# Patient Record
Sex: MALE | Race: WHITE | NOT HISPANIC OR LATINO | Employment: FULL TIME | URBAN - METROPOLITAN AREA
[De-identification: names, ages, dates, MRNs, and addresses within clinical notes are randomized per-mention and may not be internally consistent; named-entity substitution may affect disease eponyms.]

---

## 2024-01-29 ENCOUNTER — HOSPITAL ENCOUNTER (EMERGENCY)
Facility: HOSPITAL | Age: 44
Discharge: ANOTHER HOSPITAL | End: 2024-01-30
Attending: EMERGENCY MEDICINE
Payer: COMMERCIAL

## 2024-01-29 DIAGNOSIS — T18.108A ESOPHAGEAL FOREIGN BODY, INITIAL ENCOUNTER: Primary | ICD-10-CM

## 2024-01-29 PROCEDURE — 63600175 PHARM REV CODE 636 W HCPCS: Mod: JZ,TB | Performed by: EMERGENCY MEDICINE

## 2024-01-29 PROCEDURE — 96374 THER/PROPH/DIAG INJ IV PUSH: CPT

## 2024-01-29 PROCEDURE — 99285 EMERGENCY DEPT VISIT HI MDM: CPT

## 2024-01-29 RX ORDER — GLUCAGON 1 MG
1 KIT INJECTION
Status: COMPLETED | OUTPATIENT
Start: 2024-01-29 | End: 2024-01-29

## 2024-01-29 RX ADMIN — Medication 1 MG: at 11:01

## 2024-01-30 ENCOUNTER — HOSPITAL ENCOUNTER (EMERGENCY)
Facility: HOSPITAL | Age: 44
Discharge: HOME | End: 2024-01-30
Attending: EMERGENCY MEDICINE
Payer: COMMERCIAL

## 2024-01-30 ENCOUNTER — ANESTHESIA (OUTPATIENT)
Dept: ENDOSCOPY | Facility: HOSPITAL | Age: 44
End: 2024-01-30
Payer: COMMERCIAL

## 2024-01-30 ENCOUNTER — ANESTHESIA EVENT (OUTPATIENT)
Dept: ENDOSCOPY | Facility: HOSPITAL | Age: 44
End: 2024-01-30
Payer: COMMERCIAL

## 2024-01-30 VITALS
HEIGHT: 75 IN | BODY MASS INDEX: 34.49 KG/M2 | TEMPERATURE: 98 F | HEART RATE: 65 BPM | DIASTOLIC BLOOD PRESSURE: 85 MMHG | WEIGHT: 277.44 LBS | RESPIRATION RATE: 12 BRPM | OXYGEN SATURATION: 94 % | SYSTOLIC BLOOD PRESSURE: 142 MMHG

## 2024-01-30 VITALS
HEIGHT: 75 IN | BODY MASS INDEX: 34.19 KG/M2 | DIASTOLIC BLOOD PRESSURE: 75 MMHG | TEMPERATURE: 99 F | WEIGHT: 275 LBS | OXYGEN SATURATION: 100 % | SYSTOLIC BLOOD PRESSURE: 124 MMHG | HEART RATE: 86 BPM | RESPIRATION RATE: 20 BRPM

## 2024-01-30 DIAGNOSIS — T18.128A ESOPHAGEAL OBSTRUCTION DUE TO FOOD IMPACTION: Primary | ICD-10-CM

## 2024-01-30 DIAGNOSIS — K20.90 ESOPHAGITIS: ICD-10-CM
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
## 2024-01-30 DIAGNOSIS — R11.10 VOMITING, UNSPECIFIED VOMITING TYPE, UNSPECIFIED WHETHER NAUSEA PRESENT: ICD-10-CM

## 2024-01-30 DIAGNOSIS — Z01.810 PREOP CARDIOVASCULAR EXAM: ICD-10-CM
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
## 2024-01-30 DIAGNOSIS — K56.699 FOOD BOLUS OBSTRUCTION OF INTESTINE: ICD-10-CM

## 2024-01-30 LAB
ALBUMIN SERPL BCP-MCNC: 3.9 G/DL (ref 3.5–5.2)
ALP SERPL-CCNC: 71 U/L (ref 55–135)
ALT SERPL W/O P-5'-P-CCNC: 44 U/L (ref 10–44)
ANION GAP SERPL CALC-SCNC: 13 MMOL/L (ref 8–16)
AST SERPL-CCNC: 26 U/L (ref 10–40)
BASOPHILS # BLD AUTO: 0.05 K/UL (ref 0–0.2)
BASOPHILS NFR BLD: 0.4 % (ref 0–1.9)
BILIRUB SERPL-MCNC: 0.4 MG/DL (ref 0.1–1)
BUN SERPL-MCNC: 14 MG/DL (ref 6–20)
CALCIUM SERPL-MCNC: 8.7 MG/DL (ref 8.7–10.5)
CHLORIDE SERPL-SCNC: 110 MMOL/L (ref 95–110)
CO2 SERPL-SCNC: 18 MMOL/L (ref 23–29)
CREAT SERPL-MCNC: 0.8 MG/DL (ref 0.5–1.4)
DIFFERENTIAL METHOD BLD: ABNORMAL
EOSINOPHIL # BLD AUTO: 0.6 K/UL (ref 0–0.5)
EOSINOPHIL NFR BLD: 5.2 % (ref 0–8)
ERYTHROCYTE [DISTWIDTH] IN BLOOD BY AUTOMATED COUNT: 12.9 % (ref 11.5–14.5)
EST. GFR  (NO RACE VARIABLE): >60 ML/MIN/1.73 M^2
GLUCOSE SERPL-MCNC: 113 MG/DL (ref 70–110)
HCT VFR BLD AUTO: 43.9 % (ref 40–54)
HGB BLD-MCNC: 13.8 G/DL (ref 14–18)
IMM GRANULOCYTES # BLD AUTO: 0.04 K/UL (ref 0–0.04)
IMM GRANULOCYTES NFR BLD AUTO: 0.4 % (ref 0–0.5)
LYMPHOCYTES # BLD AUTO: 1.8 K/UL (ref 1–4.8)
LYMPHOCYTES NFR BLD: 16 % (ref 18–48)
MCH RBC QN AUTO: 28.2 PG (ref 27–31)
MCHC RBC AUTO-ENTMCNC: 31.4 G/DL (ref 32–36)
MCV RBC AUTO: 90 FL (ref 82–98)
MONOCYTES # BLD AUTO: 0.8 K/UL (ref 0.3–1)
MONOCYTES NFR BLD: 6.8 % (ref 4–15)
NEUTROPHILS # BLD AUTO: 8 K/UL (ref 1.8–7.7)
NEUTROPHILS NFR BLD: 71.2 % (ref 38–73)
NRBC BLD-RTO: 0 /100 WBC
PLATELET # BLD AUTO: 217 K/UL (ref 150–450)
PMV BLD AUTO: 9.3 FL (ref 9.2–12.9)
POTASSIUM SERPL-SCNC: 3.9 MMOL/L (ref 3.5–5.1)
PROT SERPL-MCNC: 6.7 G/DL (ref 6–8.4)
RBC # BLD AUTO: 4.9 M/UL (ref 4.6–6.2)
SODIUM SERPL-SCNC: 141 MMOL/L (ref 136–145)
WBC # BLD AUTO: 11.28 K/UL (ref 3.9–12.7)

## 2024-01-30 PROCEDURE — 93010 ELECTROCARDIOGRAM REPORT: CPT | Mod: ,,, | Performed by: INTERNAL MEDICINE

## 2024-01-30 PROCEDURE — 99285 EMERGENCY DEPT VISIT HI MDM: CPT | Mod: 25

## 2024-01-30 PROCEDURE — 93005 ELECTROCARDIOGRAM TRACING: CPT | Mod: 59

## 2024-01-30 PROCEDURE — 25000003 PHARM REV CODE 250: Performed by: STUDENT IN AN ORGANIZED HEALTH CARE EDUCATION/TRAINING PROGRAM

## 2024-01-30 PROCEDURE — 88305 TISSUE EXAM BY PATHOLOGIST: CPT | Performed by: PATHOLOGY

## 2024-01-30 PROCEDURE — 25000003 PHARM REV CODE 250: Performed by: INTERNAL MEDICINE

## 2024-01-30 PROCEDURE — 99285 EMERGENCY DEPT VISIT HI MDM: CPT | Mod: 25,,, | Performed by: NURSE PRACTITIONER

## 2024-01-30 PROCEDURE — D9220A PRA ANESTHESIA: Mod: ANES,,, | Performed by: ANESTHESIOLOGY

## 2024-01-30 PROCEDURE — 63600175 PHARM REV CODE 636 W HCPCS: Performed by: STUDENT IN AN ORGANIZED HEALTH CARE EDUCATION/TRAINING PROGRAM

## 2024-01-30 PROCEDURE — 25000003 PHARM REV CODE 250: Performed by: EMERGENCY MEDICINE

## 2024-01-30 PROCEDURE — 63600175 PHARM REV CODE 636 W HCPCS: Performed by: EMERGENCY MEDICINE

## 2024-01-30 PROCEDURE — C9113 INJ PANTOPRAZOLE SODIUM, VIA: HCPCS | Performed by: EMERGENCY MEDICINE

## 2024-01-30 PROCEDURE — 85025 COMPLETE CBC W/AUTO DIFF WBC: CPT | Performed by: EMERGENCY MEDICINE

## 2024-01-30 PROCEDURE — 27201012 HC FORCEPS, HOT/COLD, DISP: Performed by: INTERNAL MEDICINE

## 2024-01-30 PROCEDURE — 43239 EGD BIOPSY SINGLE/MULTIPLE: CPT | Performed by: INTERNAL MEDICINE

## 2024-01-30 PROCEDURE — 96375 TX/PRO/DX INJ NEW DRUG ADDON: CPT

## 2024-01-30 PROCEDURE — 96374 THER/PROPH/DIAG INJ IV PUSH: CPT

## 2024-01-30 PROCEDURE — 80053 COMPREHEN METABOLIC PANEL: CPT | Performed by: EMERGENCY MEDICINE

## 2024-01-30 PROCEDURE — D9220A PRA ANESTHESIA: Mod: CRNA,,, | Performed by: STUDENT IN AN ORGANIZED HEALTH CARE EDUCATION/TRAINING PROGRAM

## 2024-01-30 PROCEDURE — 43239 EGD BIOPSY SINGLE/MULTIPLE: CPT | Mod: ,,, | Performed by: INTERNAL MEDICINE

## 2024-01-30 PROCEDURE — 37000009 HC ANESTHESIA EA ADD 15 MINS: Performed by: INTERNAL MEDICINE

## 2024-01-30 PROCEDURE — 37000008 HC ANESTHESIA 1ST 15 MINUTES: Performed by: INTERNAL MEDICINE

## 2024-01-30 PROCEDURE — 88305 TISSUE EXAM BY PATHOLOGIST: CPT | Mod: 26,,, | Performed by: PATHOLOGY

## 2024-01-30 PROCEDURE — 96361 HYDRATE IV INFUSION ADD-ON: CPT | Mod: 59

## 2024-01-30 RX ORDER — SUCCINYLCHOLINE CHLORIDE 20 MG/ML
INJECTION INTRAMUSCULAR; INTRAVENOUS
Status: DISCONTINUED | OUTPATIENT
Start: 2024-01-30 | End: 2024-01-30

## 2024-01-30 RX ORDER — ROCURONIUM BROMIDE 10 MG/ML
INJECTION, SOLUTION INTRAVENOUS
Status: DISCONTINUED
Start: 2024-01-30 | End: 2024-01-30 | Stop reason: HOSPADM

## 2024-01-30 RX ORDER — MIDAZOLAM HYDROCHLORIDE 1 MG/ML
INJECTION INTRAMUSCULAR; INTRAVENOUS
Status: DISCONTINUED | OUTPATIENT
Start: 2024-01-30 | End: 2024-01-30

## 2024-01-30 RX ORDER — DEXAMETHASONE SODIUM PHOSPHATE 10 MG/ML
INJECTION INTRAMUSCULAR; INTRAVENOUS
Status: DISCONTINUED
Start: 2024-01-30 | End: 2024-01-30 | Stop reason: HOSPADM

## 2024-01-30 RX ORDER — ONDANSETRON HYDROCHLORIDE 2 MG/ML
INJECTION, SOLUTION INTRAVENOUS
Status: COMPLETED
Start: 2024-01-30 | End: 2024-01-30

## 2024-01-30 RX ORDER — PROPOFOL 10 MG/ML
VIAL (ML) INTRAVENOUS
Status: DISCONTINUED | OUTPATIENT
Start: 2024-01-30 | End: 2024-01-30

## 2024-01-30 RX ORDER — PANTOPRAZOLE SODIUM 40 MG/10ML
40 INJECTION, POWDER, LYOPHILIZED, FOR SOLUTION INTRAVENOUS
Status: COMPLETED | OUTPATIENT
Start: 2024-01-30 | End: 2024-01-30

## 2024-01-30 RX ORDER — FENTANYL CITRATE 50 UG/ML
INJECTION, SOLUTION INTRAMUSCULAR; INTRAVENOUS
Status: DISCONTINUED | OUTPATIENT
Start: 2024-01-30 | End: 2024-01-30

## 2024-01-30 RX ORDER — MIDAZOLAM HYDROCHLORIDE 1 MG/ML
INJECTION INTRAMUSCULAR; INTRAVENOUS
Status: COMPLETED
Start: 2024-01-30 | End: 2024-01-30

## 2024-01-30 RX ORDER — DEXAMETHASONE SODIUM PHOSPHATE 100 MG/10ML
INJECTION INTRAMUSCULAR; INTRAVENOUS
Status: DISCONTINUED | OUTPATIENT
Start: 2024-01-30 | End: 2024-01-30

## 2024-01-30 RX ORDER — SODIUM CHLORIDE 9 MG/ML
INJECTION, SOLUTION INTRAVENOUS CONTINUOUS
Status: DISCONTINUED | OUTPATIENT
Start: 2024-01-30 | End: 2024-01-30 | Stop reason: HOSPADM

## 2024-01-30 RX ORDER — METOCLOPRAMIDE HYDROCHLORIDE 5 MG/ML
10 INJECTION INTRAMUSCULAR; INTRAVENOUS
Status: COMPLETED | OUTPATIENT
Start: 2024-01-30 | End: 2024-01-30

## 2024-01-30 RX ORDER — FENTANYL CITRATE 50 UG/ML
INJECTION, SOLUTION INTRAMUSCULAR; INTRAVENOUS
Status: COMPLETED
Start: 2024-01-30 | End: 2024-01-30

## 2024-01-30 RX ORDER — PANTOPRAZOLE SODIUM 40 MG/1
40 TABLET, DELAYED RELEASE ORAL 2 TIMES DAILY
Qty: 180 TABLET | Refills: 0 | Status: SHIPPED | OUTPATIENT
Start: 2024-01-30 | End: 2025-01-29

## 2024-01-30 RX ORDER — NITROGLYCERIN 0.4 MG/1
0.4 TABLET SUBLINGUAL
Status: DISCONTINUED | OUTPATIENT
Start: 2024-01-30 | End: 2024-01-30 | Stop reason: HOSPADM

## 2024-01-30 RX ORDER — SODIUM CHLORIDE 9 MG/ML
1000 INJECTION, SOLUTION INTRAVENOUS
Status: COMPLETED | OUTPATIENT
Start: 2024-01-30 | End: 2024-01-30

## 2024-01-30 RX ORDER — SUCCINYLCHOLINE CHLORIDE 20 MG/ML
INJECTION INTRAMUSCULAR; INTRAVENOUS
Status: COMPLETED
Start: 2024-01-30 | End: 2024-01-30

## 2024-01-30 RX ORDER — ONDANSETRON HYDROCHLORIDE 2 MG/ML
INJECTION, SOLUTION INTRAVENOUS
Status: DISCONTINUED | OUTPATIENT
Start: 2024-01-30 | End: 2024-01-30

## 2024-01-30 RX ORDER — LIDOCAINE HYDROCHLORIDE 20 MG/ML
INJECTION, SOLUTION EPIDURAL; INFILTRATION; INTRACAUDAL; PERINEURAL
Status: DISCONTINUED
Start: 2024-01-30 | End: 2024-01-30 | Stop reason: HOSPADM

## 2024-01-30 RX ORDER — LIDOCAINE HYDROCHLORIDE 20 MG/ML
INJECTION INTRAVENOUS
Status: DISCONTINUED | OUTPATIENT
Start: 2024-01-30 | End: 2024-01-30

## 2024-01-30 RX ADMIN — MIDAZOLAM HYDROCHLORIDE 2 MG: 1 INJECTION, SOLUTION INTRAMUSCULAR; INTRAVENOUS at 11:01

## 2024-01-30 RX ADMIN — DEXAMETHASONE SODIUM PHOSPHATE 10 MG: 10 INJECTION INTRAMUSCULAR; INTRAVENOUS at 11:01

## 2024-01-30 RX ADMIN — METOCLOPRAMIDE 10 MG: 5 INJECTION, SOLUTION INTRAMUSCULAR; INTRAVENOUS at 05:01

## 2024-01-30 RX ADMIN — LIDOCAINE HYDROCHLORIDE 80 MG: 20 INJECTION, SOLUTION INTRAVENOUS at 11:01

## 2024-01-30 RX ADMIN — FENTANYL CITRATE 50 MCG: 50 INJECTION, SOLUTION INTRAMUSCULAR; INTRAVENOUS at 11:01

## 2024-01-30 RX ADMIN — SODIUM CHLORIDE: 0.9 INJECTION, SOLUTION INTRAVENOUS at 10:01

## 2024-01-30 RX ADMIN — SODIUM CHLORIDE 1000 ML: 0.9 INJECTION, SOLUTION INTRAVENOUS at 03:01

## 2024-01-30 RX ADMIN — ONDANSETRON 4 MG: 2 INJECTION, SOLUTION INTRAMUSCULAR; INTRAVENOUS at 11:01

## 2024-01-30 RX ADMIN — PANTOPRAZOLE SODIUM 40 MG: 40 INJECTION, POWDER, FOR SOLUTION INTRAVENOUS at 05:01

## 2024-01-30 RX ADMIN — SODIUM CHLORIDE 1000 ML: 0.9 INJECTION, SOLUTION INTRAVENOUS at 07:01

## 2024-01-30 RX ADMIN — SUCCINYLCHOLINE CHLORIDE 200 MG: 20 INJECTION, SOLUTION INTRAMUSCULAR; INTRAVENOUS at 11:01

## 2024-01-30 RX ADMIN — PROPOFOL 200 MG: 10 INJECTION, EMULSION INTRAVENOUS at 11:01

## 2024-01-30 NOTE — CONSULTS
[facility] Consultation Note    Patient Complaint: sensation of food stuck in thorat    PCP:   Mercedes, Primary Doctor       LOS: 0        Initial History of Present Illness (HPI):  This is a 43 y.o. male consulted to GI service for esophageal food impaction. Denies any PMH. Patient complaint of sensation of food stuck in throat with associated symptoms of coughing, choking, drooling and throat discomfort that began yesterday evening after eating a pork chop. Denies fever, hematemesis, abdominal pain, BRBPR, diarrhea or constipation. Denies smoking, drinking, NSAIDs, or anticoagulants. Denies ever having endoscopy.        Medical History:  has no past medical history on file.    Surgical History:  has no past surgical history on file.      Objective Findings:    Vital Signs:  Temp:  [98 °F (36.7 °C)-98.4 °F (36.9 °C)]   Pulse:  [63-72]   Resp:  [12-21]   BP: (126-175)/()   SpO2:  [93 %-98 %]   Body mass index is 34.37 kg/m².      Physical Exam  Vitals and nursing note reviewed.   Constitutional:       Appearance: He is obese.   HENT:      Head: Normocephalic.   Pulmonary:      Effort: Pulmonary effort is normal.   Abdominal:      General: Bowel sounds are normal.      Palpations: Abdomen is soft.   Skin:     General: Skin is warm and dry.   Neurological:      Mental Status: He is alert and oriented to person, place, and time.   Psychiatric:         Mood and Affect: Mood normal.         Behavior: Behavior normal.         Thought Content: Thought content normal.         Judgment: Judgment normal.               Labs:  Lab Results   Component Value Date    WBC 11.28 01/30/2024    HGB 13.8 (L) 01/30/2024    HCT 43.9 01/30/2024     01/30/2024    ALT 44 01/30/2024    AST 26 01/30/2024     01/30/2024    K 3.9 01/30/2024     01/30/2024    CREATININE 0.8 01/30/2024    BUN 14 01/30/2024    CO2 18 (L) 01/30/2024             Imaging: Chest xray- The lungs are clear. The cardiac silhouette is  not enlarged.  The osseous structures are unremarkable.       Endoscopy:     I have independently reviewed and interpreted the imaging above           Food Impaction.  Plan/ Recommendations:  1.  Plan for urgent upper endoscopy. start ppi bid.       Thank you so much for allowing us to participate in the care of Santana Park . Please contact us if you have any additional questions.    Lucía Macedo NP  Gastroenterology  [facility] - [place] - Med Surg

## 2024-01-30 NOTE — PROVIDER PROGRESS NOTES - EMERGENCY DEPT.
Encounter Date: 1/30/2024    ED Physician Progress Notes        Physician Note:   PT went to EGD.  REport from GI as follows:  EGD complete, food impaction had passed prior to scope. suspected EOE in esophagus and biopsies taken, esophagitis noted. rec po ppi bid x12 week, repeat egd in 8-12 weeks, request sent to . ok to d/c home from GI standpoint. ok for full liquids and advance as tolerated. VSS. Pt tolerating liquids. Stable for discharge.

## 2024-01-30 NOTE — [employer] PATIENT INSTRUCTIONS
Discharge Summary/Instructions after an Endoscopic Procedure  Patient Name: Santana Park  Patient MRN: [id number]  Patient YOB: 1980  Tuesday, January 30, 2024  Juana Flores MD  Dear patient,  As a result of recent federal legislation (The Federal Cures Act), you may   receive lab or pathology results from your procedure in your [facility]   account before your physician is able to contact you. Your physician or   their representative will relay the results to you with their   recommendations at their soonest availability.  Thank you,  RESTRICTIONS:  During your procedure today, you received medications for sedation.  These   medications may affect your judgment, balance and coordination.  Therefore,   for 24 hours, you have the following restrictions:   - DO NOT drive a car, operate machinery, make legal/financial decisions,   sign important papers or drink alcohol.    ACTIVITY:  Today: no heavy lifting, straining or running due to procedural   sedation/anesthesia.  The following day: return to full activity including work.  DIET:  Eat and drink normally unless instructed otherwise.     TREATMENT FOR COMMON SIDE EFFECTS:  - Mild abdominal pain, nausea, belching, bloating or excessive gas:  rest,   eat lightly and use a heating pad.  - Sore Throat: treat with throat lozenges and/or gargle with warm salt   water.  - Because air was used during the procedure, expelling large amounts of air   from your rectum or belching is normal.  - If a bowel prep was taken, you may not have a bowel movement for 1-3 days.    This is normal.  SYMPTOMS TO WATCH FOR AND REPORT TO YOUR PHYSICIAN:  1. Abdominal pain or bloating, other than gas cramps.  2. Chest pain.  3. Back pain.  4. Signs of infection such as: chills or fever occurring within 24 hours   after the procedure.  5. Rectal bleeding, which would show as bright red, maroon, or black stools.   (A tablespoon of blood from the rectum is not serious,  especially if   hemorrhoids are present.)  6. Vomiting.  7. Weakness or dizziness.  GO DIRECTLY TO THE NEAREST EMERGENCY ROOM IF YOU HAVE ANY OF THE FOLLOWING:      Difficulty breathing              Chills and/or fever over 101 F   Persistent vomiting and/or vomiting blood   Severe abdominal pain   Severe chest pain   Black, tarry stools   Bleeding- more than one tablespoon   Any other symptom or condition that you feel may need urgent attention  Your doctor recommends these additional instructions:  If any biopsies were taken, your doctors clinic will contact you in 1 to 2   weeks with any results.  - Discharge patient to home.   - Resume previous diet.   - Continue present medications. Recommend Protonix 40mg twice per day for   8-12 weeks.  - Await pathology results.   - Repeat upper endoscopy in 8-12 weeks to check healing.  For questions, problems or results please call your physician - Juana Flores MD at Work:  [phone].  [facility] can be reached at [phone]     IF A COMPLICATION OR EMERGENCY SITUATION ARISES AND YOU ARE UNABLE TO REACH   YOUR PHYSICIAN - GO DIRECTLY TO THE EMERGENCY ROOM.  Juana Flores MD  1/30/2024 11:28:16 AM  This report has been verified and signed electronically.  Dear patient,  As a result of recent federal legislation (The Federal Cures Act), you may   receive lab or pathology results from your procedure in your [facility]   account before your physician is able to contact you. Your physician or   their representative will relay the results to you with their   recommendations at their soonest availability.  Thank you,  PROVATION

## 2024-01-30 NOTE — ANESTHESIA PROCEDURE NOTES
Intubation    Date/Time: 1/30/2024 11:04 AM    Performed by: Prince Prater CRNA  Authorized by: Peter Henry II, MD    Intubation:     Induction:  Rapid sequence induction    Intubated:  Postinduction    Mask Ventilation:  Not attempted    Attempts:  1    Attempted By:  CRNA    Method of Intubation:  Video laryngoscopy    Blade:  Disla 3    Laryngeal View Grade: Grade I - full view of cords      Difficult Airway Encountered?: No      Complications:  None    Airway Device:  Oral endotracheal tube    Airway Device Size:  7.5    Style/Cuff Inflation:  Cuffed (inflated to minimal occlusive pressure)    Tube secured:  23    Secured at:  The lips    Placement Verified By:  Capnometry    Complicating Factors:  None    Findings Post-Intubation:  BS equal bilateral

## 2024-01-30 NOTE — PLAN OF CARE
Procedure and recovery complete. Pt awake and alert. MD and wife at bedside, procedure findings and suggestions discussed. Procedure report given given, pt verbalized understanding of instructions. Transfer report given to RN, understanding verbalized. Pt brought back to ED room by tech, accompanied by wife.

## 2024-01-30 NOTE — TRANSFER OF CARE
"Anesthesia Transfer of Care Note    Patient: Santana Park    Procedure(s) Performed: Procedure(s) (LRB):  EGD (ESOPHAGOGASTRODUODENOSCOPY) (N/A)    Patient location: GI    Anesthesia Type: general    Transport from OR: Transported from OR on 100% O2 by closed face mask with adequate spontaneous ventilation    Post pain: adequate analgesia    Post assessment: no apparent anesthetic complications    Post vital signs: stable    Level of consciousness: lethargic    Nausea/Vomiting: no nausea/vomiting    Complications: none    Transfer of care protocol was followed      Last vitals: Visit Vitals  /77 (BP Location: Right arm, Patient Position: Lying)   Pulse 90   Temp 36.4 °C (97.5 °F) (Axillary)   Resp 16   Ht 6' 3" (1.905 m)   Wt 124.7 kg (275 lb)   SpO2 99%   BMI 34.37 kg/m²     "

## 2024-01-30 NOTE — ANESTHESIA PREPROCEDURE EVALUATION
01/30/2024  Santana Park is a 43 y.o., male.      Pre-op Assessment    I have reviewed the Patient Summary Reports.     I have reviewed the Nursing Notes. I have reviewed the NPO Status.   I have reviewed the Medications.     Review of Systems  Anesthesia Hx:  No problems with previous Anesthesia                Social:  Vaping, Social Alcohol Use       Hematology/Oncology:  Hematology Normal   Oncology Normal                                   Cardiovascular:  Cardiovascular Normal                                            Pulmonary:  Pulmonary Normal                       Renal/:  Renal/ Normal                 Hepatic/GI:  Hepatic/GI Normal                 Neurological:  Neurology Normal                                      Psych:  Psychiatric Normal                    Physical Exam  General: Well nourished, Cooperative, Alert and Oriented    Airway:  Mallampati: III   Mouth Opening: Normal  TM Distance: Normal  Tongue: Normal  Neck ROM: Normal ROM    Dental:  Intact    Chest/Lungs:  Clear to auscultation, Normal Respiratory Rate    Heart:  Rate: Normal  Rhythm: Regular Rhythm  Sounds: Normal        Anesthesia Plan  Type of Anesthesia, risks & benefits discussed:    Anesthesia Type: Gen ETT  Intra-op Monitoring Plan: Standard ASA Monitors  Induction:  rapid sequence  Airway Plan: Video, Post-Induction  Informed Consent: Informed consent signed with the Patient and all parties understand the risks and agree with anesthesia plan.  All questions answered. Patient consented to blood products? Yes  ASA Score: 2  Day of Surgery Review of History & Physical: H&P Update referred to the surgeon/provider.    Ready For Surgery From Anesthesia Perspective.     .

## 2024-01-30 NOTE — ED PROVIDER NOTES
Encounter Date: 1/30/2024       History     Chief Complaint   Patient presents with    Food Bolus     Patient presents to ED as a transfer from [place] ED secondary to food bolus. States was eating porkchop at 1930 and has had foreign body since. No relief with 1mg of glucagon and is not tolerating secretions. Transferred to see GI and be scoped first thing in morning. Patient denies pain and is able to speak in complete sentences. VSS.      43-year-old male [facility] as transfer from [facility] for esophageal food impaction.  Patient was in usual state of health until 7:30 p.m. last night Monday 01/29/2024 when he was eating a pork chop and it became stuck in his throat.  Since then, patient has been unable to tolerate even his secretions.  He states that he intermittently has pain, and then vomits up secretions, which relieves pain somewhat.  This has never happened previously.  [facility] administered IV glucagon 1 mg without relief.        Review of patient's allergies indicates:  No Known Allergies  History reviewed. No pertinent past medical history.  History reviewed. No pertinent surgical history.  History reviewed. No pertinent family history.     Review of Systems   Constitutional:  Negative for fever.   HENT:          Foreign body sensation in throat   Eyes:  Negative for visual disturbance.   Respiratory:  Negative for shortness of breath.    Cardiovascular:  Negative for chest pain.   Gastrointestinal:  Positive for abdominal pain and vomiting.   Genitourinary:  Negative for dysuria.   Musculoskeletal:  Negative for gait problem.   Skin:  Negative for rash.   Neurological:  Negative for syncope.       Physical Exam     Initial Vitals [01/30/24 0243]   BP Pulse Resp Temp SpO2   (!) 150/85 69 18 98.1 °F (36.7 °C) 97 %      MAP       --         Physical Exam    Nursing note and vitals reviewed.  Constitutional: He appears well-developed and well-nourished. He is not diaphoretic.   Awake,  alert, nontoxic. Holding emesis bag of clear secretions.   HENT:   Head: Normocephalic and atraumatic.   Eyes: Conjunctivae and EOM are normal. Pupils are equal, round, and reactive to light.   Neck: Neck supple.   Normal range of motion.  Cardiovascular:  Normal rate and intact distal pulses.           Pulmonary/Chest: No respiratory distress.   Abdominal: Abdomen is soft. There is no abdominal tenderness.   Musculoskeletal:         General: Normal range of motion.      Cervical back: Normal range of motion and neck supple.     Neurological: He is alert and oriented to person, place, and time. He has normal strength.   Moving all extremities   Skin: Skin is warm and dry.   Psychiatric: He has a normal mood and affect.         ED Course   Procedures  Labs Reviewed   CBC W/ AUTO DIFFERENTIAL - Abnormal; Notable for the following components:       Result Value    Hemoglobin 13.8 (*)     MCHC 31.4 (*)     Gran # (ANC) 8.0 (*)     Eos # 0.6 (*)     Lymph % 16.0 (*)     All other components within normal limits   COMPREHENSIVE METABOLIC PANEL - Abnormal; Notable for the following components:    CO2 18 (*)     Glucose 113 (*)     All other components within normal limits     EKG Readings: (Independently Interpreted)   EKG 05:46: NSR, HR 73. Normal axis. No ectopy. No STEMI.        Imaging Results    None          Medications   pantoprazole injection 40 mg (has no administration in time range)   sodium chloride 0.9% bolus 1,000 mL 1,000 mL (1,000 mLs Intravenous New Bag 1/30/24 0311)   metoclopramide injection 10 mg (10 mg Intravenous Given 1/30/24 0503)     Medical Decision Making  44 yo male with foreign body sensation to throat and inability to tolerate secretions since 7:30pm yesterday 1/29/24.    Differential includes esophageal food impaction, dehydration, esophageal diverticulum, other.    HPI and symptoms consistent with esophageal food impaction.    Dr. Amirah Pelayo at [facility] discussed patient with  transfer center ([facility]) for transfer to facility with GI.  I have placed inpatient GI consult.  Patient will board in ER pending scope.    EKG no STEMI.    CBC, CMP reassuring.  Bicarb 18 likely due to vomiting.      I ordered IV NS 1L followed by IV reglan 10mg and IV protonix 40mg for indigestion.    Nimisha Coleman  5:49 AM      Amount and/or Complexity of Data Reviewed  Labs: ordered.  ECG/medicine tests: ordered.                                      Clinical Impression:  Final diagnoses:  [Z01.810] Preop cardiovascular exam  [T18.128A, W44.F3XA] Esophageal obstruction due to food impaction (Primary)  [R11.10] Vomiting, unspecified vomiting type, unspecified whether nausea present                 Nimisha Coleman MD  01/30/24 1250

## 2024-01-30 NOTE — ED PROVIDER NOTES
Encounter Date: 1/29/2024       History     Chief Complaint   Patient presents with    Emesis     43-year-old male with complaints of food got stuck in the food pipe.    Patient says about 7:00 p.m. he was eating pork chops when he suddenly felt something got stuck behind his chest.  Patient says since then he has not able to eat or drink anything.  Patient says anytime he tries to drink anything it comes right back up.    Patient denies any other complaint.        Review of patient's allergies indicates:  No Known Allergies  No past medical history on file.  No past surgical history on file.  No family history on file.     Review of Systems   Constitutional: Negative.    HENT: Negative.     Eyes: Negative.    Respiratory: Negative.     Cardiovascular: Negative.    Gastrointestinal: Negative.         Meat impaction   Endocrine: Negative.    Genitourinary: Negative.    Musculoskeletal: Negative.    Skin: Negative.        Physical Exam     Initial Vitals [01/29/24 2309]   BP Pulse Resp Temp SpO2   (!) 173/86 67 16 98.4 °F (36.9 °C) 98 %      MAP       --         Physical Exam    Nursing note and vitals reviewed.  Constitutional: He appears well-developed and well-nourished.   HENT:   Head: Normocephalic and atraumatic.   Eyes: EOM are normal. Pupils are equal, round, and reactive to light.   Neck: Neck supple.   Normal range of motion.  Cardiovascular:  Normal rate and regular rhythm.           Pulmonary/Chest: Breath sounds normal.   Abdominal: Abdomen is soft. There is no abdominal tenderness.   Musculoskeletal:         General: Normal range of motion.      Cervical back: Normal range of motion and neck supple.     Neurological: He is alert and oriented to person, place, and time.         ED Course   Procedures  Labs Reviewed - No data to display       Imaging Results    None          Medications   glucagon (human recombinant) injection 1 mg (1 mg Intravenous Given 1/29/24 7765)     Medical Decision Making  12:23  a.m.    43-year-old male who came in with complaints of meat impaction and unable to swallow anything.    Patient given glucagon in the ER with no improvement.  Patient is still unable to swallow anything.    Discussed with Dr[facility] Mcghee at [facility] and he accepted the patient.  We will transfer patient for endoscopy.    Risk  Prescription drug management.                                      Clinical Impression:  Final diagnoses:  [T18.108A] Esophageal foreign body, initial encounter (Primary)          ED Disposition Condition    Transfer to Another Facility Stable                Amirah Pelayo MD  01/30/24 0026

## 2024-01-30 NOTE — ED TRIAGE NOTES
Patient presents to ED from [facility] via [facility] with c/o food bolus.Patient states he was eating a pork chop  around 1930 last night. Patient c/o indigestion and states he is unable to swallow secretions. Given glucagon at [facility] without relief. Patients AAO x4.

## 2024-01-30 NOTE — ED TRIAGE NOTES
Pt identified x2 pt identifiers. 42 YO male presents today via private vehicle from home with c/o nausea and vomiting. Patient states he was eating and choked about 3 hours ago. Patient states since then he felt like something was stuck and when he drinks something the fluid just comes right back up. Reports this has never happened before.     Pt is AO x4, speaking in full clear sentences, respirations even and unlabored. Skin warm, dry, intact, appropriate for ethnicity.     Patient updated on plan of care. Patient verbalized understanding to inform RN of any changes in symptoms.

## 2024-01-31 NOTE — ANESTHESIA POSTPROCEDURE EVALUATION
Anesthesia Post Evaluation    Patient: Santana Park    Procedure(s) Performed: Procedure(s) (LRB):  EGD (ESOPHAGOGASTRODUODENOSCOPY) (N/A)    Final Anesthesia Type: general      Patient location during evaluation: PACU  Patient participation: Yes- Able to Participate  Level of consciousness: awake and alert  Post-procedure vital signs: reviewed and stable  Pain management: adequate  Airway patency: patent    PONV status at discharge: No PONV  Anesthetic complications: no      Respiratory status: spontaneous ventilation and room air  Hydration status: euvolemic  Follow-up not needed.              Vitals Value Taken Time   /75 01/30/24 1155   Temp 37 °C (98.6 °F) 01/30/24 1213   Pulse 86 01/30/24 1213   Resp 20 01/30/24 1213   SpO2 100 % 01/30/24 1213         Event Time   Out of Recovery 12:00:50         Pain/Obey Score: Obey Score: 10 (1/30/2024 11:55 AM)

## 2024-02-01 LAB
COMMENT: NORMAL
FINAL PATHOLOGIC DIAGNOSIS: NORMAL
GROSS: NORMAL
Lab: NORMAL

## 2024-02-02 ENCOUNTER — TELEPHONE (OUTPATIENT)
Dept: ENDOSCOPY | Facility: HOSPITAL | Age: 44
End: 2024-02-02
Payer: COMMERCIAL

## 2024-02-02 DIAGNOSIS — K20.90 ESOPHAGITIS: Primary | ICD-10-CM

## 2024-02-02 NOTE — TELEPHONE ENCOUNTER
Spoke to pt to schedule procedure(s) Upper Endoscopy (EGD)       Physician to perform procedure(s) Dr. ZEENAT Flores   Date of Procedure (s) 04/16/24  Arrival Time 9 :30 AM  Time of Procedure(s) 10:30 AM   Location of Procedure(s) [facility] 2nd Floor--  Enter at the rear of the building through the emergency department screening station or the Outpatient Registration door, then continue to endoscopy department on the 2nd floor.    Type of Rx Prep sent to patient: N/A  Instructions provided to patient via [facility]    Patient was informed on the following information and verbalized understanding. Screening questionnaire reviewed with patient and complete. If procedure requires anesthesia, a responsible adult needs to be present to accompany the patient home, patient cannot drive after receiving anesthesia. Appointment details are tentative, especially check-in time. Patient will receive a prep-op call 7 days prior to confirm check-in time for procedure. If applicable the patient should contact their pharmacy to verify Rx for procedure prep is ready for pick-up. Patient was advised to call the scheduling department at [phone] if pharmacy states no Rx is available. Patient was advised to call the endoscopy scheduling department if any questions or concerns arise.      [facility] Scheduling Department

## 2024-02-02 NOTE — TELEPHONE ENCOUNTER
"----- Message from Tayla Cartwright sent at 2024 11:33 AM CST -----  Regarding: FW: Endo scheduling    ----- Message -----  From: Lucía Macedo NP  Sent: 2024  11:26 AM CST  To: [facility]  Subject: Endo scheduling                                  Procedure: EGD    Diagnosis: Esophagitis    Procedure Timin-12 weeks    #If within 4 weeks selected, please krystyna as high priority#    #If greater than 12 weeks, please select "4-12 weeks" and delay sending until 2 months prior to requested date#     Provider: Dr Flores    Location: [facility]    Additional Scheduling Information: No scheduling concerns    Prep Specifications:N/A    Have you attached a patient to this message: Yes        "

## 2024-04-09 ENCOUNTER — TELEPHONE (OUTPATIENT)
Dept: ENDOSCOPY | Facility: HOSPITAL | Age: 44
End: 2024-04-09
Payer: COMMERCIAL

## 2024-04-09 NOTE — TELEPHONE ENCOUNTER
Patient did not answer call and voicemail was full.  Sent portal message for patient to call Endoscopy Scheduling to review instructions and confirm appointment for Upper endoscopy (EGD)  on 4/16/24.

## 2024-04-11 ENCOUNTER — TELEPHONE (OUTPATIENT)
Dept: ENDOSCOPY | Facility: HOSPITAL | Age: 44
End: 2024-04-11
Payer: COMMERCIAL

## 2024-04-11 NOTE — TELEPHONE ENCOUNTER
----- Message from Amy Leslie sent at 4/11/2024  3:04 PM CDT -----  Regarding: Cancellation  Contact: [phone]  Calling to cancel upcoming procedure due to insurance issues and cost. Please call to confirm and reschedule if needed.

## 2024-07-16 ENCOUNTER — HOSPITAL ENCOUNTER (EMERGENCY)
Facility: HOSPITAL | Age: 44
Discharge: HOME | End: 2024-07-16
Attending: EMERGENCY MEDICINE
Payer: COMMERCIAL

## 2024-07-16 VITALS
TEMPERATURE: 99 F | SYSTOLIC BLOOD PRESSURE: 125 MMHG | HEART RATE: 74 BPM | OXYGEN SATURATION: 97 % | WEIGHT: 280.88 LBS | DIASTOLIC BLOOD PRESSURE: 79 MMHG | HEIGHT: 75 IN | RESPIRATION RATE: 18 BRPM | BODY MASS INDEX: 34.92 KG/M2

## 2024-07-16 DIAGNOSIS — S32.009D CLOSED FRACTURE OF TRANSVERSE PROCESS OF LUMBAR VERTEBRA WITH ROUTINE HEALING, SUBSEQUENT ENCOUNTER: Primary | ICD-10-CM

## 2024-07-16 DIAGNOSIS — M54.16 LUMBAR RADICULOPATHY: ICD-10-CM

## 2024-07-16 PROCEDURE — 63600175 PHARM REV CODE 636 W HCPCS: Performed by: EMERGENCY MEDICINE

## 2024-07-16 PROCEDURE — 96372 THER/PROPH/DIAG INJ SC/IM: CPT | Performed by: EMERGENCY MEDICINE

## 2024-07-16 PROCEDURE — 25000003 PHARM REV CODE 250: Performed by: EMERGENCY MEDICINE

## 2024-07-16 PROCEDURE — 99285 EMERGENCY DEPT VISIT HI MDM: CPT | Mod: 25

## 2024-07-16 RX ORDER — HYDROCODONE BITARTRATE AND ACETAMINOPHEN 5; 325 MG/1; MG/1
1 TABLET ORAL EVERY 6 HOURS PRN
Qty: 15 TABLET | Refills: 0 | Status: SHIPPED | OUTPATIENT
Start: 2024-07-16 | End: 2024-07-19

## 2024-07-16 RX ORDER — HYDROMORPHONE HYDROCHLORIDE 1 MG/ML
1 INJECTION, SOLUTION INTRAMUSCULAR; INTRAVENOUS; SUBCUTANEOUS
Status: COMPLETED | OUTPATIENT
Start: 2024-07-16 | End: 2024-07-16

## 2024-07-16 RX ORDER — HYDROCODONE BITARTRATE AND ACETAMINOPHEN 5; 325 MG/1; MG/1
1 TABLET ORAL
Status: COMPLETED | OUTPATIENT
Start: 2024-07-16 | End: 2024-07-16

## 2024-07-16 RX ADMIN — HYDROCODONE BITARTRATE AND ACETAMINOPHEN 1 TABLET: 5; 325 TABLET ORAL at 03:07

## 2024-07-16 RX ADMIN — HYDROMORPHONE HYDROCHLORIDE 1 MG: 1 INJECTION, SOLUTION INTRAMUSCULAR; INTRAVENOUS; SUBCUTANEOUS at 02:07

## 2024-07-16 NOTE — PLAN OF CARE
07/16/24 1429   Post-Acute Status   Post-Acute Authorization E   HME Status (!) Pending Delivery   Discharge Delays (!) Home Medical Equipment (Insurance, Delivery)   Discharge Plan   Discharge Plan A Home with family   Discharge Plan B Home with family         LILIA contacted to obtained TLSO brace for patient before he discharges from the ED.     CM contacted the [facility] line and spoke with Stephanie [facility] representative. She took patient's information and states they are hoping to get here before 5PM. However she does state that there is also no estimated time of arrival.   Nurse notified of the above and also given the Brace line to check status in the event CM is no longer here for the day.     [facility]ACE line [phone].

## 2024-07-16 NOTE — ED PROVIDER NOTES
Encounter Date: 7/16/2024       History     Chief Complaint   Patient presents with    Fall    Back Pain     HPI  Patient is a 43y WM presenting with low back pain worsening for the past week.  He fell down wooden stairs directly on his back.  Was seen at urgent care with negative xrays and discharged with robaxin.  The pain has been slowly improving. Yesterday he was getting undressed and when he lifted the right leg he immediately felt a sharp pain shooting down the back of the left leg.      Review of patient's allergies indicates:  No Known Allergies  No past medical history on file.  Past Surgical History:   Procedure Laterality Date    ESOPHAGOGASTRODUODENOSCOPY N/A 1/30/2024    Procedure: EGD (ESOPHAGOGASTRODUODENOSCOPY);  Surgeon: Juana Flores MD;  Location: [facility];  Service: Endoscopy;  Laterality: N/A;     No family history on file.  Social History     Tobacco Use    Smoking status: Unknown     Review of Systems   Constitutional:  Negative for chills and fever.   Respiratory:  Negative for cough.    Cardiovascular:  Negative for chest pain.   Musculoskeletal:  Positive for back pain.   All other systems reviewed and are negative.    Social Determinants of Health     Tobacco Use: Not on file   Alcohol Use: Not on file   Financial Resource Strain: Not on file   Food Insecurity: Not on file   Transportation Needs: Not on file   Physical Activity: Not on file   Stress: Not on file   Housing Stability: Not on file   Depression: Not on file   Utilities: Not on file   Health Literacy: Not on file   Social Isolation: Not on file       Physical Exam     Initial Vitals [07/16/24 1325]   BP Pulse Resp Temp SpO2   (!) 134/90 91 18 98.5 °F (36.9 °C) 97 %      MAP       --         Physical Exam    Nursing note and vitals reviewed.  Constitutional: He appears well-developed and well-nourished.   HENT:   Head: Normocephalic and atraumatic.   Mouth/Throat: Oropharynx is clear and moist.   Eyes: EOM are normal.  Pupils are equal, round, and reactive to light.   Neck:   Normal range of motion.  Cardiovascular:  Normal rate and intact distal pulses.           Pulmonary/Chest: Breath sounds normal.   Abdominal: Abdomen is soft. There is no abdominal tenderness.   Genitourinary:    Penis normal.     Musculoskeletal:         General: Tenderness present. Normal range of motion.      Cervical back: Normal range of motion.      Comments: LUMBAR MIDLINE TENDERNESS  BRUISING ON BACK     Neurological: He is alert. GCS score is 15. GCS eye subscore is 4. GCS verbal subscore is 5. GCS motor subscore is 6.   Skin: Skin is warm. Capillary refill takes less than 2 seconds.         ED Course   Procedures  Labs Reviewed - No data to display       Imaging Results              CT Thoracic Spine Without Contrast (Final result)  Result time 07/16/24 14:15:03      Final result by Marlys Lilly MD (07/16/24 14:15:03)                   Impression:      1. Mild degenerative changes of the thoracic spine without fracture or subluxation.  2. Small right thyroid nodule.  Consider further evaluation with a nonemergent outpatient thyroid ultrasound.      Electronically signed by: Marlys Lilly MD  Date:    07/16/2024  Time:    14:15               Narrative:    EXAMINATION:  CT THORACIC SPINE WITHOUT CONTRAST    CLINICAL HISTORY:  midline back pain s/p fall down stairs;    TECHNIQUE:  CT thoracic spine performed without contrast.  Coronal and sagittal reformats provided.    COMPARISON:  None    FINDINGS:  The visualized lungs are clear.  Visualized upper abdominal structures have a normal appearance.  There is a right-sided thyroid nodule.    Vertebral body alignment and heights are maintained.  Mild multilevel disc space narrowing with endplate sclerosis.  No fracture or subluxation of the thoracic spine is seen.                                       CT Lumbar Spine Without Contrast (Final result)  Result time 07/16/24 14:09:22      Final result by  Marlys Lilly MD (07/16/24 14:09:22)                   Impression:      Fractures of the left L3 through L5 transverse processes, mildly displaced at L3 and nondisplaced at L4 and L5.  There is some cortical irregularity involving the margins of the sacrum bilaterally at the sacroiliac joints.  It is difficult to determine if this is congenital, degenerative or possibly postsurgical in nature rather than posttraumatic as no comparison imaging is available.    Degenerative changes at the L4-5 and L5-S1 levels contributing to central canal stenosis and neural foraminal narrowing.      Electronically signed by: Marlys Lilly MD  Date:    07/16/2024  Time:    14:09               Narrative:    EXAMINATION:  CT LUMBAR SPINE WITHOUT CONTRAST    CLINICAL HISTORY:  Low back pain, increased fracture risk;midline L1 pain s/p fall down stairs;    TECHNIQUE:  Low-dose axial, sagittal and coronal reformations are obtained through the lumbar spine.  Contrast was not administered.    COMPARISON:  None.    FINDINGS:  The incidentally visualized soft tissue structures of the abdomen have a normal appearance.  Vertebral body alignment and heights are maintained.  There is disc space narrowing with endplate sclerosis at the L4-5 and L5-S1 levels with associated marginal osteophytosis.  There are fractures of the left L3 through L5 transverse processes, the fracture at L3 mildly displaced with the L4 and L5 fractures nondisplaced.  There is some cortical irregularity involving the margins of the sacrum bilaterally appearing fairly symmetric.  Is difficult to determine if this is related to possible congenital, degenerative or postsurgical process rather than an acute fracture is there is no surrounding presacral edema.  Correlation with patient history is recommended    At L4-5, broad-based posterior disc bulge extending into both neural foramina with osseous spurring in the neural foramina and facet arthropathy contributing to  moderate central canal stenosis with moderate bilateral neural foraminal narrowing.    At L5-S1, broad-based posterior disc bulge extending into both neural foramina with osseous spurring in the neural foramina and facet arthropathy contributing to mild moderate central canal stenosis with mild right and moderate severe left neural foraminal narrowing.                                       Medications   HYDROmorphone injection 1 mg (1 mg Intramuscular Given 7/16/24 1431)   HYDROcodone-acetaminophen 5-325 mg per tablet 1 tablet (1 tablet Oral Given 7/16/24 1502)     Medical Decision Making  This is an emergent evaluation of a 43y WM presenting with low back pain radiating down the left leg after falling down stairs.  Exam he has midline lumbar spinal tenderness and no sciatic nerve tenderness.  CT shows L3-L5 left transverse process fractures.  Placed in TLSO brace.  Discharged with norco and referral to neurosurgery.    DDX: strain, sprain fracture    Amount and/or Complexity of Data Reviewed  Radiology: ordered.    Risk  Prescription drug management.                                      Clinical Impression:  Final diagnoses:  [S32.009D] Closed fracture of transverse process of lumbar vertebra with routine healing, subsequent encounter (Primary)  [M54.16] Lumbar radiculopathy          ED Disposition Condition    Discharge Stable          ED Prescriptions       Medication Sig Dispense Start Date End Date Auth. Provider    HYDROcodone-acetaminophen (NORCO) 5-325 mg per tablet Take 1 tablet by mouth every 6 (six) hours as needed. 15 tablet 7/16/2024 7/19/2024 Josselin Lopez MD          Follow-up Information       Follow up With Specialties Details Why Contact Info    Tanya Costello MD Neurosurgery Schedule an appointment as soon as possible for a visit in 1 day  [address] [place] [address]  [address] Floor, [address]  [facility] [address]  [phone]               Josselin Lopez,  MD  07/16/24 6568

## 2024-07-16 NOTE — Clinical Note
"Santana Taylor" Xavier was seen and treated in our emergency department on 7/16/2024.  He may return to work on 07/18/2024.       If you have any questions or concerns, please don't hesitate to call.      Josselin Lopez MD"

## 2024-07-16 NOTE — ED TRIAGE NOTES
Patient reports fell down a couple of steps Wednesday hitting his mi/lower back. Patient was seen at Urgent Care and prescribed Robaxin and Lidoderm patches. Patient reports was feeling better, but felt a shooting pain down left leg after lifting his leg.

## 2024-07-16 NOTE — Clinical Note
"Santana Taylor" Xavier was seen and treated in our emergency department on 7/16/2024.  He may return to work after being cleared by follow-up physician 07/23/2024.       If you have any questions or concerns, please don't hesitate to call.       RN"

## 2024-07-25 ENCOUNTER — PATIENT MESSAGE (OUTPATIENT)
Dept: NEUROSURGERY | Facility: CLINIC | Age: 44
End: 2024-07-25

## 2024-07-25 ENCOUNTER — OFFICE VISIT (OUTPATIENT)
Dept: NEUROSURGERY | Facility: CLINIC | Age: 44
End: 2024-07-25
Payer: COMMERCIAL

## 2024-07-25 VITALS
WEIGHT: 280.63 LBS | DIASTOLIC BLOOD PRESSURE: 91 MMHG | BODY MASS INDEX: 35.08 KG/M2 | HEART RATE: 86 BPM | SYSTOLIC BLOOD PRESSURE: 142 MMHG

## 2024-07-25 DIAGNOSIS — S32.009D CLOSED FRACTURE OF TRANSVERSE PROCESS OF LUMBAR VERTEBRA WITH ROUTINE HEALING, SUBSEQUENT ENCOUNTER: ICD-10-CM

## 2024-07-25 DIAGNOSIS — M54.9 DORSALGIA, UNSPECIFIED: Primary | ICD-10-CM

## 2024-07-25 PROCEDURE — 99999 PR PBB SHADOW E&M-EST. PATIENT-LVL III: CPT | Mod: PBBFAC,,, | Performed by: NURSE PRACTITIONER

## 2024-07-25 PROCEDURE — 3008F BODY MASS INDEX DOCD: CPT | Mod: CPTII,S$GLB,, | Performed by: NURSE PRACTITIONER

## 2024-07-25 PROCEDURE — 1160F RVW MEDS BY RX/DR IN RCRD: CPT | Mod: CPTII,S$GLB,, | Performed by: NURSE PRACTITIONER

## 2024-07-25 PROCEDURE — 3077F SYST BP >= 140 MM HG: CPT | Mod: CPTII,S$GLB,, | Performed by: NURSE PRACTITIONER

## 2024-07-25 PROCEDURE — 99204 OFFICE O/P NEW MOD 45 MIN: CPT | Mod: S$GLB,,, | Performed by: NURSE PRACTITIONER

## 2024-07-25 PROCEDURE — 1159F MED LIST DOCD IN RCRD: CPT | Mod: CPTII,S$GLB,, | Performed by: NURSE PRACTITIONER

## 2024-07-25 PROCEDURE — 3080F DIAST BP >= 90 MM HG: CPT | Mod: CPTII,S$GLB,, | Performed by: NURSE PRACTITIONER

## 2024-07-25 RX ORDER — HYDROCODONE BITARTRATE AND ACETAMINOPHEN 5; 325 MG/1; MG/1
1 TABLET ORAL EVERY 6 HOURS PRN
COMMUNITY

## 2024-07-25 RX ORDER — METHOCARBAMOL 500 MG/1
1000 TABLET, FILM COATED ORAL 3 TIMES DAILY
COMMUNITY
Start: 2024-07-11

## 2024-07-25 NOTE — PROGRESS NOTES
Neurosurgery  History & Physical    SUBJECTIVE:     History of Present Illness: Santana Park is a 43 y.o. male being seen in clinic today with his wife to follow-up on his recent ED visit from 7/16/24. The patient slipped and fell down stairs at work landing on the left side of his low back. Denies hitting his head or neck. The ED obtained imaging which showed L3-L5 left transverse process fractures. He was placed in a brace, provided with pain medication, and instructed to follow-up outpatient. Today, he reports continued left-sided back pain. Reports slight improvement in the pain since his ED visit. Denies new neurological deficits. Rates the pain as a 5/10. Describes the pain as severe and sharp. The pain is no longer radiating down the legs. Aggravating factors include standing, bending, twisting, or lifting. Alleviating factors include rest. Denies weakness, numbness or tingling, b/b dysfunction, saddle anesthesia, or gait instability.  He is taking the muscle relaxer and pain medication only as needed with mild relief.     Review of patient's allergies indicates:  No Known Allergies    Current Outpatient Medications   Medication Sig Dispense Refill    HYDROcodone-acetaminophen (NORCO) 5-325 mg per tablet Take 1 tablet by mouth every 6 (six) hours as needed for Pain.      methocarbamoL (ROBAXIN) 500 MG Tab Take 1,000 mg by mouth 3 (three) times daily.      pantoprazole (PROTONIX) 40 MG tablet Take 1 tablet (40 mg total) by mouth 2 (two) times daily. 180 tablet 0     No current facility-administered medications for this visit.       No past medical history on file.  Past Surgical History:   Procedure Laterality Date    ESOPHAGOGASTRODUODENOSCOPY N/A 1/30/2024    Procedure: EGD (ESOPHAGOGASTRODUODENOSCOPY);  Surgeon: Juana Flores MD;  Location: [facility];  Service: Endoscopy;  Laterality: N/A;     Family History    None       Social History     Socioeconomic History    Marital status:     Tobacco Use    Smoking status: Unknown       Review of Systems    OBJECTIVE:     Vital Signs  Pulse: 86  BP: (!) 142/91  Pain Score:   5  Weight: 127.3 kg (280 lb 10.3 oz)  Body mass index is 35.08 kg/m².      Neurosurgery Physical Exam  General: well developed, well nourished, no distress.   Head: normocephalic, atraumatic  Neurologic: Alert and oriented. Thought content appropriate.  GCS: Motor: 6/Verbal: 5/Eyes: 4 GCS Total: 15  Mental Status: Awake, Alert, Oriented x 4  Language: No aphasia  Speech: No dysarthria  Cranial nerves: face symmetric, tongue midline, CN II-XII grossly intact.   Eyes: pupils equal, round, reactive to light with accomodation, EOMI.   Pulmonary: normal respirations, no signs of respiratory distress  Abdomen: soft, non-distended, not tender to palpation  Skin: Skin is warm, dry and intact.  Sensory: intact to light touch throughout  Motor Strength:Moves all extremities spontaneously with good tone.    Gait antalgic     Thoracic:  Midline TTP: Negative.     Lumbar:  Midline TTP: Negative. Pain to palpation to paraspinal muscles on the LEFT    Diagnostic Results:  I have personally reviewed the CT thoracic and lumbar spine as mentioned in the HPI.      ASSESSMENT/PLAN:     Santana Park is a 43 y.o. male seen in clinic today with his wife to follow-up on his recent ED visit from 7/16/24 where he was diagnosed with L3-5 left TP fractures after the patient slipped and fell down stairs at work landing on the left side of his low back. We discussed the healing process for these fractures. No surgical intervention is indicated at this time. Given the physical demands of his job duties, I have ordered PT and will keep him out of work for an additional 6 weeks to re-evaluate him and ensure additional healing of the fractures. He has submitted paperwork to workman's comp. I have encouraged him to contact the clinic with any questions, concerns, or adverse clinical changes. He verbalized  understanding.      ALYSIA Salguero  Neurosurgery  [facility].      Note dictated with voice recognition software, please excuse any grammatical errors.

## 2024-07-26 ENCOUNTER — CLINICAL SUPPORT (OUTPATIENT)
Dept: REHABILITATION | Facility: HOSPITAL | Age: 44
End: 2024-07-26
Payer: OTHER MISCELLANEOUS

## 2024-07-26 DIAGNOSIS — M54.9 DORSALGIA: ICD-10-CM

## 2024-07-26 DIAGNOSIS — M54.9 DORSALGIA, UNSPECIFIED: ICD-10-CM

## 2024-07-26 DIAGNOSIS — S32.009D CLOSED FRACTURE OF TRANSVERSE PROCESS OF LUMBAR VERTEBRA WITH ROUTINE HEALING, SUBSEQUENT ENCOUNTER: ICD-10-CM

## 2024-07-26 DIAGNOSIS — S32.009D CLOSED FRACTURE OF LUMBAR VERTEBRA WITH ROUTINE HEALING, UNSPECIFIED FRACTURE MORPHOLOGY, UNSPECIFIED LUMBAR VERTEBRAL LEVEL, SUBSEQUENT ENCOUNTER: Primary | ICD-10-CM

## 2024-07-26 PROCEDURE — 97530 THERAPEUTIC ACTIVITIES: CPT | Mod: PN

## 2024-07-26 PROCEDURE — 97032 APPL MODALITY 1+ESTIM EA 15: CPT | Mod: PN

## 2024-07-26 PROCEDURE — 97161 PT EVAL LOW COMPLEX 20 MIN: CPT | Mod: PN

## 2024-07-26 NOTE — PLAN OF CARE
[facility]   Physical Therapy Initial Evaluation     Date: 7/26/2024   Name: Santana Park  [facility] Number: [id number]    Therapy Diagnosis:   Encounter Diagnoses   Name Primary?    Closed fracture of transverse process of lumbar vertebra with routine healing, subsequent encounter     Dorsalgia, unspecified     Closed fracture of lumbar vertebra with routine healing, unspecified fracture morphology, unspecified lumbar vertebral level, subsequent encounter Yes    Dorsalgia      Physician: Elayne Agudelo, NP    Physician Orders: PT Eval and Treat   Medical Diagnosis from Referral: S32.009D (ICD-10-CM) - Closed fracture of transverse process of lumbar vertebra with routine healing, subsequent encounter M54.9 (ICD-10-CM) - Dorsalgia, unspecified  Evaluation Date: 7/26/2024  Authorization Period Expiration: 12/31/2024  Plan of Care Expiration: 9/6/2024  Progress Note Due: 8/23/2024  Visit # / Visits authorized: 1/ 1   FOTO: 1/3    Precautions: Standard     Time In: 0800  Time Out: 0845  Total Appointment Time (timed & untimed codes): 45 minutes      SUBJECTIVE     Date of onset: 7-10-24 slipped and fell at work    History of current condition - Alberto reports: that he went to urgent care after his fall at work. He was sent away with no fracture only medication for muscle strain. He reports that he was home when he went to reach towards the ground and had a sharp pain down his leg. He went to the ED and had CT scan done which showed a fracture at L3-5 transverse processes and was given an LSO to wear. Patient reports that he has minimal pain with sitting. He does report pain relief with standing while wearing the brace.    Falls: 7-10-24    Imaging, CT scan films:       Narrative & Impression  EXAMINATION:  CT LUMBAR SPINE WITHOUT CONTRAST     CLINICAL HISTORY:  Low back pain, increased fracture risk;midline L1 pain s/p fall down stairs;     TECHNIQUE:  Low-dose axial, sagittal and coronal  reformations are obtained through the lumbar spine.  Contrast was not administered.     COMPARISON:  None.     FINDINGS:  The incidentally visualized soft tissue structures of the abdomen have a normal appearance.  Vertebral body alignment and heights are maintained.  There is disc space narrowing with endplate sclerosis at the L4-5 and L5-S1 levels with associated marginal osteophytosis.  There are fractures of the left L3 through L5 transverse processes, the fracture at L3 mildly displaced with the L4 and L5 fractures nondisplaced.  There is some cortical irregularity involving the margins of the sacrum bilaterally appearing fairly symmetric.  Is difficult to determine if this is related to possible congenital, degenerative or postsurgical process rather than an acute fracture is there is no surrounding presacral edema.  Correlation with patient history is recommended     At L4-5, broad-based posterior disc bulge extending into both neural foramina with osseous spurring in the neural foramina and facet arthropathy contributing to moderate central canal stenosis with moderate bilateral neural foraminal narrowing.     At L5-S1, broad-based posterior disc bulge extending into both neural foramina with osseous spurring in the neural foramina and facet arthropathy contributing to mild moderate central canal stenosis with mild right and moderate severe left neural foraminal narrowing.     Impression:     Fractures of the left L3 through L5 transverse processes, mildly displaced at L3 and nondisplaced at L4 and L5.  There is some cortical irregularity involving the margins of the sacrum bilaterally at the sacroiliac joints.  It is difficult to determine if this is congenital, degenerative or possibly postsurgical in nature rather than posttraumatic as no comparison imaging is available.     Degenerative changes at the L4-5 and L5-S1 levels contributing to central canal stenosis and neural foraminal narrowing.       Prior  Therapy: none  Social History:  lives with their spouse  Occupation: [employer]  Prior Level of Function: Independent without difficulty  Current Level of Function: Independent with difficulty and pain    Pain:  Current 3/10, worst 7/10, best 3/10   Location: left back    Description: Sharp and Shooting  Aggravating Factors: Standing and minimal twisting  Easing Factors: lying down and rest    Patients goals: Patient to return to previous level of function with minimal pain and overall increased function.     Medical History:   No past medical history on file.    Surgical History:   Santana Park  has a past surgical history that includes Esophagogastroduodenoscopy (N/A, 1/30/2024).    Medications:   Santana has a current medication list which includes the following prescription(s): hydrocodone-acetaminophen, methocarbamol, and pantoprazole.    Allergies:   Review of patient's allergies indicates:  No Known Allergies       OBJECTIVE     Posture:  Mild FHP, decreased lumbar lordosis    Lumbar Range of Motion:    Degrees Pain   Flexion 25%   +        Extension 10%   +        Left rotation   50% -        Right Rotation   75% +             Lower Extremity Strength  Right LE  Left LE    Knee extension: 4/5 Knee extension: 4/5   Knee flexion: 4/5 Knee flexion: 4/5   Hip flexion: 4/5 Hip flexion: 4/5   Ankle dorsiflexion: 4/5 Ankle dorsiflexion: 4/5       Neuro Dynamic Testing:    Sciatic nerve:      SLR: R = -     L = -       Femoral Nerve:    Femoral nerve test: NT      Joint Mobility: NT    Palpation: TTP along lumbar paraspinal    Sensation: Intact    Flexibility:       Popliteal Angle: R = 10 degrees ; L = 10 degrees       Limitation/Restriction for FOTO Lumbar Survey    Therapist reviewed FOTO scores for Santana Park on 7/26/2024.   FOTO documents entered into [email or web address] - see Media section.    Limitation Score: 46%         TREATMENT     Total Treatment time (time-based codes) separate from Evaluation:  25 minutes      Alberto received the treatments listed below:      therapeutic exercises to develop  for  minutes including:      manual therapy techniques:  were applied to the:  for  minutes, including:      neuromuscular re-education activities to improve:  for  minutes. The following activities were included:      therapeutic activities to improve functional performance for 10  minutes, including:  -log rolling from supine to sit    gait training to improve functional mobility and safety for   minutes, including:      direct contact modalities after being cleared for contraindications:     supervised modalities after being cleared for contradictions: IFC Electrical Stimulation:  Santana received IFC Electrical Stimulation for pain control applied to the lumbar spine.     hot pack for 10 minutes to lumbar spine.    cold pack for  minutes to .      PATIENT EDUCATION AND HOME EXERCISES     Education provided:   - bed mobility  -Movements to avoid    Written Home Exercises Provided: yes. Exercises were reviewed and Alberto was able to demonstrate them prior to the end of the session.  Alberto demonstrated good  understanding of the education provided. See EMR under Patient Instructions for exercises provided during therapy sessions.    ASSESSMENT     Santana is a 43 y.o. male referred to outpatient Physical Therapy with a medical diagnosis of S32.009D (ICD-10-CM) - Closed fracture of transverse process of lumbar vertebra with routine healing, subsequent encounter M54.9 (ICD-10-CM) - Dorsalgia, unspecified. Patient presents with decreased lumbar mobility, transverse process fracture, increased pain and TTP. Patient would benefit from skilled physical therapy to address the above mentioned deficits.    Patient prognosis is Good.   Patient will benefit from skilled outpatient Physical Therapy to address the deficits stated above and in the chart below, provide patient /family education, and to maximize patientt's level of  independence.     Plan of care discussed with patient: Yes  Patient's spiritual, cultural and educational needs considered and patient is agreeable to the plan of care and goals as stated below:     Anticipated Barriers for therapy: recent fracture    Medical Necessity is demonstrated by the following  History  Co-morbidities and personal factors that may impact the plan of care Co-morbidities:   none    Personal Factors:   no deficits     low   Examination  Body Structures and Functions, activity limitations and participation restrictions that may impact the plan of care Body Regions:   back    Body Systems:    gross symmetry  ROM  strength           low   Clinical Presentation evolving clinical presentation with changing clinical characteristics low   Decision Making/ Complexity Score: low     Goals:  Short Term Goals (4 Weeks):  1. Patient will be compliant with home exercise program to supplement therapy in promoting functional mobility.  2. Patient will perform supine to sit with good control to demonstrate improved core strength.  3. Patient will report no pain during thoracolumbar active range of motion to promote functional mobility.    Long Term Goals (6 Weeks):   1. Patient will improve FOTO score to </= 46% limited to decrease perceived limitation with maintaining/changing body position.   2. Patient will perform sit to stand with good control to demonstrate improved core strength.  3. Patient will report no pain during lumbar ROM to promote return to PLOF. ** weeks       PLAN   Plan of care Certification: 7/26/2024 to 9/6/2024.    Outpatient Physical Therapy 2 times weekly for 6 weeks to include the following interventions: Electrical Stimulation  , Manual Therapy, Neuromuscular Re-ed, Patient Education, Therapeutic Activities, and Therapeutic Exercise.     Leeanna Perez, PT      I CERTIFY THE NEED FOR THESE SERVICES FURNISHED UNDER THIS PLAN OF TREATMENT AND WHILE UNDER MY CARE   Physician's comments:      Physician's Signature: ___________________________________________________

## 2024-07-31 ENCOUNTER — PATIENT MESSAGE (OUTPATIENT)
Dept: NEUROSURGERY | Facility: CLINIC | Age: 44
End: 2024-07-31
Payer: COMMERCIAL

## 2024-07-31 RX ORDER — METHOCARBAMOL 500 MG/1
1000 TABLET, FILM COATED ORAL 3 TIMES DAILY
Qty: 60 TABLET | Refills: 0 | Status: SHIPPED | OUTPATIENT
Start: 2024-07-31 | End: 2024-08-10

## 2024-08-01 ENCOUNTER — CLINICAL SUPPORT (OUTPATIENT)
Dept: REHABILITATION | Facility: HOSPITAL | Age: 44
End: 2024-08-01
Payer: COMMERCIAL

## 2024-08-01 DIAGNOSIS — M54.9 DORSALGIA: ICD-10-CM

## 2024-08-01 DIAGNOSIS — S32.009D CLOSED FRACTURE OF LUMBAR VERTEBRA WITH ROUTINE HEALING, UNSPECIFIED FRACTURE MORPHOLOGY, UNSPECIFIED LUMBAR VERTEBRAL LEVEL, SUBSEQUENT ENCOUNTER: Primary | ICD-10-CM

## 2024-08-01 PROCEDURE — 97112 NEUROMUSCULAR REEDUCATION: CPT | Mod: PN

## 2024-08-01 PROCEDURE — 97110 THERAPEUTIC EXERCISES: CPT | Mod: PN

## 2024-08-01 PROCEDURE — 97032 APPL MODALITY 1+ESTIM EA 15: CPT | Mod: PN

## 2024-08-01 NOTE — PROGRESS NOTES
[facility]   Physical Therapy Treatment Note      Name: Santana Park  [facility] Number: [id number]    Therapy Diagnosis:   Encounter Diagnoses   Name Primary?    Closed fracture of lumbar vertebra with routine healing, unspecified fracture morphology, unspecified lumbar vertebral level, subsequent encounter Yes    Dorsalgia      Physician: Elayne Agudelo NP    Visit Date: 8/1/2024    Physician Orders: PT Eval and Treat   Medical Diagnosis from Referral: S32.009D (ICD-10-CM) - Closed fracture of transverse process of lumbar vertebra with routine healing, subsequent encounter M54.9 (ICD-10-CM) - Dorsalgia, unspecified  Evaluation Date: 7/26/2024  Authorization Period Expiration: 12/31/2024  Plan of Care Expiration: 9/6/2024  Progress Note Due: 8/23/2024  Visit # / Visits authorized: 1/ 1   FOTO: 1/3     Precautions: Standard      Time In: 1425  Time Out: 1505  Total Appointment Time (timed & untimed codes): 40 minutes    PTA Visit #: -/5       Subjective     Patient reports: that he had an allergic reaction and was sneezing excessively on Tuesday. He reports that his symptoms were exacerbated by this.   He was compliant with home exercise program.  Response to previous treatment: positive  Functional change: minimal    Pain: 6/10  Location: left back      Objective      Objective Measures updated at progress report unless specified.     Treatment     Alberto received the treatments listed below:      therapeutic exercises to develop strength, endurance, posture, and core stabilization for 20 minutes including:  -1 x 10 TA with diaphragmatic breathing  -2 x 10 glute sets  -2 x 10 quad sets  -recumbent bike x 5 min level 4  -standing gastroc stretch 10 x 10 seconds    manual therapy techniques:  were applied to the:  for  minutes, including:      neuromuscular re-education activities to improve:  for  minutes. The following activities were included:      therapeutic activities to improve  functional performance for   minutes, including:      gait training to improve functional mobility and safety for   minutes, including:      direct contact modalities after being cleared for contraindications:     supervised modalities after being cleared for contradictions: IFC Electrical Stimulation:  Santana received IFC Electrical Stimulation for pain control applied to the lumbar spine. Pt received stimulation at 100 % scan at a frequency of 9 for 10 minutes. Santana tolderated treatment well without any adverse effects.      hot pack for 10 minutes to lumbar spine.    cold pack for  minutes to .    Patient Education and Home Exercises       Education provided:   - Cont home exercise program  -TM walking    Written Home Exercises Provided: improving. Exercises were reviewed and Alberto was able to demonstrate them prior to the end of the session.  Alberto demonstrated good  understanding of the education provided. See Electronic Medical Record under Patient Instructions for exercises provided during therapy sessions    Assessment     Patient tolerated today's treatment well with no increased reports of pain or discomfort. Will continue to increase core strengthening and mobility as tolerated.    Alberto Is progressing well towards his goals.   Patient prognosis is Good.     Patient will continue to benefit from skilled outpatient physical therapy to address the deficits listed in the problem list box on initial evaluation, provide pt/family education and to maximize pt's level of independence in the home and community environment.     Patient's spiritual, cultural and educational needs considered and pt agreeable to plan of care and goals.     Anticipated barriers to physical therapy: fracture healing    Goals: Short Term Goals (4 Weeks):  1. Patient will be compliant with home exercise program to supplement therapy in promoting functional mobility.  2. Patient will perform supine to sit with good control to  demonstrate improved core strength.  3. Patient will report no pain during thoracolumbar active range of motion to promote functional mobility.     Long Term Goals (6 Weeks):   1. Patient will improve FOTO score to </= 46% limited to decrease perceived limitation with maintaining/changing body position.   2. Patient will perform sit to stand with good control to demonstrate improved core strength.  3. Patient will report no pain during lumbar ROM to promote return to PLOF. ** weeks         PLAN   Plan of care Certification: 7/26/2024 to 9/6/2024.     Outpatient Physical Therapy 2 times weekly for 6 weeks to include the following interventions: Electrical Stimulation  , Manual Therapy, Neuromuscular Re-ed, Patient Education, Therapeutic Activities, and Therapeutic Exercise.       Leeanna Perez, PT

## 2024-08-07 ENCOUNTER — CLINICAL SUPPORT (OUTPATIENT)
Dept: REHABILITATION | Facility: HOSPITAL | Age: 44
End: 2024-08-07
Payer: OTHER MISCELLANEOUS

## 2024-08-07 DIAGNOSIS — S32.009D CLOSED FRACTURE OF LUMBAR VERTEBRA WITH ROUTINE HEALING, UNSPECIFIED FRACTURE MORPHOLOGY, UNSPECIFIED LUMBAR VERTEBRAL LEVEL, SUBSEQUENT ENCOUNTER: Primary | ICD-10-CM

## 2024-08-07 DIAGNOSIS — M54.9 DORSALGIA: ICD-10-CM

## 2024-08-07 PROCEDURE — 97530 THERAPEUTIC ACTIVITIES: CPT | Mod: PN,CQ

## 2024-08-07 PROCEDURE — 97110 THERAPEUTIC EXERCISES: CPT | Mod: PN,CQ

## 2024-08-09 ENCOUNTER — CLINICAL SUPPORT (OUTPATIENT)
Dept: REHABILITATION | Facility: HOSPITAL | Age: 44
End: 2024-08-09
Payer: OTHER MISCELLANEOUS

## 2024-08-09 DIAGNOSIS — M54.9 DORSALGIA: ICD-10-CM

## 2024-08-09 DIAGNOSIS — S32.009D CLOSED FRACTURE OF LUMBAR VERTEBRA WITH ROUTINE HEALING, UNSPECIFIED FRACTURE MORPHOLOGY, UNSPECIFIED LUMBAR VERTEBRAL LEVEL, SUBSEQUENT ENCOUNTER: Primary | ICD-10-CM

## 2024-08-09 PROCEDURE — 97112 NEUROMUSCULAR REEDUCATION: CPT | Mod: PN

## 2024-08-09 PROCEDURE — 97110 THERAPEUTIC EXERCISES: CPT | Mod: PN

## 2024-08-13 ENCOUNTER — CLINICAL SUPPORT (OUTPATIENT)
Dept: REHABILITATION | Facility: HOSPITAL | Age: 44
End: 2024-08-13
Payer: OTHER MISCELLANEOUS

## 2024-08-13 DIAGNOSIS — S32.009D CLOSED FRACTURE OF LUMBAR VERTEBRA WITH ROUTINE HEALING, UNSPECIFIED FRACTURE MORPHOLOGY, UNSPECIFIED LUMBAR VERTEBRAL LEVEL, SUBSEQUENT ENCOUNTER: Primary | ICD-10-CM

## 2024-08-13 DIAGNOSIS — M54.9 DORSALGIA: ICD-10-CM

## 2024-08-13 PROCEDURE — 97110 THERAPEUTIC EXERCISES: CPT | Mod: PN,CQ

## 2024-08-13 PROCEDURE — 97530 THERAPEUTIC ACTIVITIES: CPT | Mod: PN,CQ

## 2024-08-13 NOTE — PROGRESS NOTES
[facility]   Physical Therapy Treatment Note      Name: Santana Park  [facility] Number: [id number]    Therapy Diagnosis:   Encounter Diagnoses   Name Primary?    Closed fracture of lumbar vertebra with routine healing, unspecified fracture morphology, unspecified lumbar vertebral level, subsequent encounter Yes    Dorsalgia      Physician: Elayne Agudelo NP    Visit Date: 8/13/2024    Physician Orders: PT Eval and Treat   Medical Diagnosis from Referral: S32.009D (ICD-10-CM) - Closed fracture of transverse process of lumbar vertebra with routine healing, subsequent encounter M54.9 (ICD-10-CM) - Dorsalgia, unspecified  Evaluation Date: 7/26/2024  Authorization Period Expiration: 12/31/2024  Plan of Care Expiration: 9/6/2024  Progress Note Due: 8/23/2024  Visit # / Visits authorized: 4/ 1   FOTO: 1/3     Precautions: Standard      Time In: 9:45  Time Out:  10:29  Total Appointment Time (timed & untimed codes): 44 minutes    PTA Visit #: 1/5       Subjective     Patient reports: he has been doing low impact cardio at the gym like walking on the [employer].   He was compliant with home exercise program.  Response to previous treatment: positive  Functional change: minimal    Pain: 2/10  Location: left back      Objective      Objective Measures updated at progress report unless specified.     Treatment     Alberto received the treatments listed below:      therapeutic exercises to develop strength, endurance, posture, and core stabilization for 17 minutes including:  -recumbent bike x 5 min level 5  -upper body ergometer 3'/3' forward/backwards  -PPT w/ swiss ball under feet x 10  -Freemotion Shoulder ext 7# 2x10  -Freemotion row 10# 2x10    Not performed today:  -standing gastroc stretch 10 x 10 seconds  -1 x 10 TA with diaphragmatic breathing  -2 x 10 glute sets  -2 x 10 quad sets  -Swiss ball LE flexion x 10    manual therapy techniques: were applied to the:  for  minutes,  including:    neuromuscular re-education activities to improve:  for  minutes. The following activities were included:    therapeutic activities to improve functional performance for 27 minutes, including:  -Shuttle leg press 4 black 2x10  -shuttle heel raises 4 black 2 x 10  -Paloff press 10# 2x10 each direction  -Seated on BOSU mini reclined crunch 2x10  -Seated on BOSU reclined 5# lift 2x10    gait training to improve functional mobility and safety for   minutes, including:    supervised modalities after being cleared for contradictions: IFC Electrical Stimulation:  Santana received IFC Electrical Stimulation for pain control applied to the lumbar spine. Pt received stimulation at 100 % scan at a frequency of 9 for 0 minutes. Santana tolerated treatment well without any adverse effects. - Not applied today    hot pack for 0 minutes to lumbar spine.    cold pack for  minutes to .    Patient Education and Home Exercises       Education provided:   - Cont home exercise program  -TM walking  -  Importance of core strength and abdominal activation during functional mobility    Written Home Exercises Provided: improving. Exercises were reviewed and Alberto was able to demonstrate them prior to the end of the session.  Alberto demonstrated good  understanding of the education provided. See Electronic Medical Record under Patient Instructions for exercises provided during therapy sessions    Assessment     Progressed ex's today with mild discomfort reported. Encouraged pt to effectively engage abdominal muscles. He verbalized understanding, but will cont to need physical training to improve strength in this area. Also, encouraged pt to perform piriformis and hip opener stretches at home to improve mobility in LARRY hips per pt's reports of hip tightness during prolonged sitting or supine position. He verbalized understanding.     Alberto Is progressing well towards his goals.   Patient prognosis is Good.     Patient will  continue to benefit from skilled outpatient physical therapy to address the deficits listed in the problem list box on initial evaluation, provide pt/family education and to maximize pt's level of independence in the home and community environment.     Patient's spiritual, cultural and educational needs considered and pt agreeable to plan of care and goals.     Anticipated barriers to physical therapy: fracture healing    Goals: Short Term Goals (4 Weeks):  1. Patient will be compliant with home exercise program to supplement therapy in promoting functional mobility.  2. Patient will perform supine to sit with good control to demonstrate improved core strength.  3. Patient will report no pain during thoracolumbar active range of motion to promote functional mobility.     Long Term Goals (6 Weeks):   1. Patient will improve FOTO score to </= 46% limited to decrease perceived limitation with maintaining/changing body position.   2. Patient will perform sit to stand with good control to demonstrate improved core strength.  3. Patient will report no pain during lumbar ROM to promote return to PLOF. ** weeks         PLAN   Plan of care Certification: 7/26/2024 to 9/6/2024.     Outpatient Physical Therapy 2 times weekly for 6 weeks to include the following interventions: Electrical Stimulation  , Manual Therapy, Neuromuscular Re-ed, Patient Education, Therapeutic Activities, and Therapeutic Exercise.       Joi Delgado, PTA

## 2024-08-15 ENCOUNTER — CLINICAL SUPPORT (OUTPATIENT)
Dept: REHABILITATION | Facility: HOSPITAL | Age: 44
End: 2024-08-15
Payer: OTHER MISCELLANEOUS

## 2024-08-15 DIAGNOSIS — S32.009D CLOSED FRACTURE OF LUMBAR VERTEBRA WITH ROUTINE HEALING, UNSPECIFIED FRACTURE MORPHOLOGY, UNSPECIFIED LUMBAR VERTEBRAL LEVEL, SUBSEQUENT ENCOUNTER: Primary | ICD-10-CM

## 2024-08-15 DIAGNOSIS — M54.9 DORSALGIA: ICD-10-CM

## 2024-08-15 PROCEDURE — 97112 NEUROMUSCULAR REEDUCATION: CPT | Mod: PN

## 2024-08-15 PROCEDURE — 97110 THERAPEUTIC EXERCISES: CPT | Mod: PN

## 2024-08-15 NOTE — PROGRESS NOTES
[facility]   Physical Therapy Treatment Note      Name: Santana Park  [facility] Number: [id number]    Therapy Diagnosis:   Encounter Diagnoses   Name Primary?    Closed fracture of lumbar vertebra with routine healing, unspecified fracture morphology, unspecified lumbar vertebral level, subsequent encounter Yes    Dorsalgia      Physician: Elayne Agudelo NP    Visit Date: 8/15/2024    Physician Orders: PT Eval and Treat   Medical Diagnosis from Referral: S32.009D (ICD-10-CM) - Closed fracture of transverse process of lumbar vertebra with routine healing, subsequent encounter M54.9 (ICD-10-CM) - Dorsalgia, unspecified  Evaluation Date: 7/26/2024  Authorization Period Expiration: 12/31/2024  Plan of Care Expiration: 9/6/2024  Progress Note Due: 8/23/2024  Visit # / Visits authorized: 6/ 12  FOTO: 1/3     Precautions: Standard      Time In: 11:45  Time Out:  1225  Total Appointment Time (timed & untimed codes): 40 minutes    PTA Visit #: 1/5       Subjective     Patient reports: he was a little sore and had minimal increased pain since his last visit.  He was compliant with home exercise program.  Response to previous treatment: positive  Functional change: minimal    Pain: 2/10  Location: left back      Objective      Objective Measures updated at progress report unless specified.     Treatment     Alberto received the treatments listed below:      therapeutic exercises to develop strength, endurance, posture, and core stabilization for 17 minutes including:  -recumbent bike x 5 min level 5  -upper body ergometer 3'/3' forward/backwards  -Freemotion Shoulder ext 7# 2x10  -Freemotion row 10# 2x10    Not performed today:  -PPT w/ swiss ball under feet x 10  -standing gastroc stretch 10 x 10 seconds  -1 x 10 TA with diaphragmatic breathing  -2 x 10 glute sets  -2 x 10 quad sets  -Swiss ball LE flexion x 10    manual therapy techniques: were applied to the:  for  minutes,  including:    neuromuscular re-education activities to improve:  for  minutes. The following activities were included:    therapeutic activities to improve functional performance for 27 minutes, including:  -Shuttle leg press 4 black 2x10  -shuttle heel raises 4 black 2 x 10  -Paloff press 10# 2x10 each direction    Not performed:  -Seated on BOSU mini reclined crunch 2x10  -Seated on BOSU reclined 5# lift 2x10    gait training to improve functional mobility and safety for   minutes, including:    supervised modalities after being cleared for contradictions: IFC Electrical Stimulation:  Santana received IFC Electrical Stimulation for pain control applied to the lumbar spine. Pt received stimulation at 100 % scan at a frequency of 9 for 0 minutes. Santana tolerated treatment well without any adverse effects. - Not applied today    hot pack for 0 minutes to lumbar spine.    cold pack for  minutes to .    Patient Education and Home Exercises       Education provided:   - Cont home exercise program  -TM walking  - Avoiding rotational movements    Written Home Exercises Provided: improving. Exercises were reviewed and Alberto was able to demonstrate them prior to the end of the session.  Alberto demonstrated good  understanding of the education provided. See Electronic Medical Record under Patient Instructions for exercises provided during therapy sessions    Assessment     Santana tolerated today's session well with no increase in pain or discomfort. Patient continues to do TM walking, upper body strengthening, and hip strengthening at home. Continuing emphasis of exercises on single plane movement avoiding multiplanar movement including rotation.     Alberto Is progressing well towards his goals.   Patient prognosis is Good.     Patient will continue to benefit from skilled outpatient physical therapy to address the deficits listed in the problem list box on initial evaluation, provide pt/family education and to  maximize pt's level of independence in the home and community environment.     Patient's spiritual, cultural and educational needs considered and pt agreeable to plan of care and goals.     Anticipated barriers to physical therapy: fracture healing    Goals: Short Term Goals (4 Weeks):  1. Patient will be compliant with home exercise program to supplement therapy in promoting functional mobility. MET  2. Patient will perform supine to sit with good control to demonstrate improved core strength.  3. Patient will report no pain during thoracolumbar active range of motion to promote functional mobility.     Long Term Goals (6 Weeks):   1. Patient will improve FOTO score to </= 46% limited to decrease perceived limitation with maintaining/changing body position.   2. Patient will perform sit to stand with good control to demonstrate improved core strength.  3. Patient will report no pain during lumbar ROM to promote return to PLOF. ** weeks         PLAN   Plan of care Certification: 7/26/2024 to 9/6/2024.     Outpatient Physical Therapy 2 times weekly for 6 weeks to include the following interventions: Electrical Stimulation  , Manual Therapy, Neuromuscular Re-ed, Patient Education, Therapeutic Activities, and Therapeutic Exercise.       Leeanna Perez, PT

## 2024-08-20 ENCOUNTER — CLINICAL SUPPORT (OUTPATIENT)
Dept: REHABILITATION | Facility: HOSPITAL | Age: 44
End: 2024-08-20
Payer: OTHER MISCELLANEOUS

## 2024-08-20 DIAGNOSIS — M54.9 DORSALGIA: ICD-10-CM

## 2024-08-20 DIAGNOSIS — S32.009D CLOSED FRACTURE OF LUMBAR VERTEBRA WITH ROUTINE HEALING, UNSPECIFIED FRACTURE MORPHOLOGY, UNSPECIFIED LUMBAR VERTEBRAL LEVEL, SUBSEQUENT ENCOUNTER: Primary | ICD-10-CM

## 2024-08-20 PROCEDURE — 97110 THERAPEUTIC EXERCISES: CPT | Mod: PN,CQ

## 2024-08-20 PROCEDURE — 97530 THERAPEUTIC ACTIVITIES: CPT | Mod: PN,CQ

## 2024-08-20 NOTE — PROGRESS NOTES
[facility]   Physical Therapy Treatment Note      Name: Santana Park  [facility] Number: [id number]    Therapy Diagnosis:   Encounter Diagnoses   Name Primary?    Closed fracture of lumbar vertebra with routine healing, unspecified fracture morphology, unspecified lumbar vertebral level, subsequent encounter Yes    Dorsalgia      Physician: Elayne Agudelo NP    Visit Date: 8/20/2024    Physician Orders: PT Eval and Treat   Medical Diagnosis from Referral: S32.009D (ICD-10-CM) - Closed fracture of transverse process of lumbar vertebra with routine healing, subsequent encounter M54.9 (ICD-10-CM) - Dorsalgia, unspecified  Evaluation Date: 7/26/2024  Authorization Period Expiration: 12/31/2024  Plan of Care Expiration: 9/6/2024  Progress Note Due: 8/23/2024  Visit # / Visits authorized: 7/ 12  FOTO: 1/3     Precautions: Standard      Time In: 10:32  Time Out:  11:10  Total Appointment Time (timed & untimed codes): 38 minutes    PTA Visit #: 1/5       Subjective     Patient reports: feeling good today with minimal discomfort.  He was compliant with home exercise program.  Response to previous treatment: positive  Functional change: minimal    Pain: 1/10  Location: left back      Objective      Objective Measures updated at progress report unless specified.     Treatment     Alberto received the treatments listed below:      therapeutic exercises to develop strength, endurance, posture, and core stabilization for 23 minutes including:  -recumbent bike x 5 min level 5  -upper body ergometer 3'/3' forward/backwards  -Freemotion Shoulder ext 7# 2x10  -Freemotion row 10# 2x10  -SB bridging 2x10 in supine  -Swiss ball LE flexion 2 x 10 in supine  -Swiss ball iso push contraction 10x 5 sec hold in supine    Not performed today:  -PPT w/ swiss ball under feet x 10  -standing gastroc stretch 10 x 10 seconds  -1 x 10 TA with diaphragmatic breathing  -2 x 10 glute sets  -2 x 10 quad sets    manual  therapy techniques: were applied to the:  for  minutes, including:    neuromuscular re-education activities to improve:  for  minutes. The following activities were included:    therapeutic activities to improve functional performance for 15 minutes, including:  -Shuttle leg press 4 black 2x10 (D/C at next session)  -shuttle heel raises 4 black 2 x 10 (D/C at next session)  -Paloff press 10# 2x10 each direction  -Airex sit<>stand x 10 reps    Not performed:  -Seated on BOSU mini reclined crunch 2x10  -Seated on BOSU reclined 5# lift 2x10    gait training to improve functional mobility and safety for   minutes, including:    supervised modalities after being cleared for contradictions: IFC Electrical Stimulation:  Santana received IFC Electrical Stimulation for pain control applied to the lumbar spine. Pt received stimulation at 100 % scan at a frequency of 9 for 0 minutes. Santana tolerated treatment well without any adverse effects. - Not applied today    hot pack for 0 minutes to lumbar spine.    cold pack for  minutes to .    Patient Education and Home Exercises       Education provided:   - Cont home exercise program  -TM walking  - Avoiding rotational movements    Written Home Exercises Provided: improving. Exercises were reviewed and Alberto was able to demonstrate them prior to the end of the session.  Alberto demonstrated good  understanding of the education provided. See Electronic Medical Record under Patient Instructions for exercises provided during therapy sessions    Assessment     Pt reports discomfort in back during shuttle activities d/t location of board placement. Therefore, will d/c this activity and modify as needed. He reports overall improved mobility in LARRY hips with less episodes of pain since starting the home stretches.    Alberto Is progressing well towards his goals.   Patient prognosis is Good.     Patient will continue to benefit from skilled outpatient physical therapy to address the  deficits listed in the problem list box on initial evaluation, provide pt/family education and to maximize pt's level of independence in the home and community environment.     Patient's spiritual, cultural and educational needs considered and pt agreeable to plan of care and goals.     Anticipated barriers to physical therapy: fracture healing    Goals: Short Term Goals (4 Weeks):  1. Patient will be compliant with home exercise program to supplement therapy in promoting functional mobility. MET  2. Patient will perform supine to sit with good control to demonstrate improved core strength.  3. Patient will report no pain during thoracolumbar active range of motion to promote functional mobility.     Long Term Goals (6 Weeks):   1. Patient will improve FOTO score to </= 46% limited to decrease perceived limitation with maintaining/changing body position.   2. Patient will perform sit to stand with good control to demonstrate improved core strength.  3. Patient will report no pain during lumbar ROM to promote return to PLOF. ** weeks         PLAN   Plan of care Certification: 7/26/2024 to 9/6/2024.     Outpatient Physical Therapy 2 times weekly for 6 weeks to include the following interventions: Electrical Stimulation  , Manual Therapy, Neuromuscular Re-ed, Patient Education, Therapeutic Activities, and Therapeutic Exercise.       Joi Delgado, PTA

## 2024-08-27 ENCOUNTER — CLINICAL SUPPORT (OUTPATIENT)
Dept: REHABILITATION | Facility: HOSPITAL | Age: 44
End: 2024-08-27
Payer: OTHER MISCELLANEOUS

## 2024-08-27 DIAGNOSIS — S32.009D CLOSED FRACTURE OF LUMBAR VERTEBRA WITH ROUTINE HEALING, UNSPECIFIED FRACTURE MORPHOLOGY, UNSPECIFIED LUMBAR VERTEBRAL LEVEL, SUBSEQUENT ENCOUNTER: Primary | ICD-10-CM

## 2024-08-27 DIAGNOSIS — M54.9 DORSALGIA: ICD-10-CM

## 2024-08-27 PROCEDURE — 97530 THERAPEUTIC ACTIVITIES: CPT | Mod: PN,CQ

## 2024-08-27 PROCEDURE — 97110 THERAPEUTIC EXERCISES: CPT | Mod: PN,CQ

## 2024-08-27 NOTE — PROGRESS NOTES
[facility]   Physical Therapy Treatment Note      Name: Santana Park  [facility] Number: [id number]    Therapy Diagnosis:   Encounter Diagnoses   Name Primary?    Closed fracture of lumbar vertebra with routine healing, unspecified fracture morphology, unspecified lumbar vertebral level, subsequent encounter Yes    Dorsalgia      Physician: Elayne Agudelo NP    Visit Date: 8/27/2024    Physician Orders: PT Eval and Treat   Medical Diagnosis from Referral: S32.009D (ICD-10-CM) - Closed fracture of transverse process of lumbar vertebra with routine healing, subsequent encounter M54.9 (ICD-10-CM) - Dorsalgia, unspecified  Evaluation Date: 7/26/2024  Authorization Period Expiration: 12/31/2024  Plan of Care Expiration: 9/6/2024  Progress Note Due: 8/23/2024  Visit # / Visits authorized: 7/ 12  FOTO: 1/3     Precautions: Standard      Time In: 945  Time Out: 1048  Total Appointment Time (timed & untimed codes): 58 minutes    PTA Visit #: 2/5       Subjective     Patient reports: walked about 2.5 miles with some tightness noted in LB. He reports he did perform his stretches. Pt expressed currently not having pain but is still concerned about returning back to work d/t his job requirements of heavy lifting, twisting, carrying, & climbing ladders.   He was compliant with home exercise program.  Response to previous treatment: positive  Functional change: minimal    Pain: 0/10  Location: left back      Objective      Objective Measures updated at progress report unless specified.     Treatment     Albreto received the treatments listed below:      therapeutic exercises to develop strength, endurance, posture, and core stabilization for 28 minutes including:  -recumbent bike x 5 min level 5  -upper body ergometer 3'/3' forward/backwards  -Freemotion Shoulder ext 10# 2x10  -Freemotion row 10# 2x10 (increase wt at next session)  -Belarusian ball iso push contraction 10x 5 sec hold in  supine  +Freemotion wood chop high>low 10# 2x10    Not performed today:  -PPT w/ swiss ball under feet x 10  -standing gastroc stretch 10 x 10 seconds  -1 x 10 TA with diaphragmatic breathing  -2 x 10 glute sets  -2 x 10 quad sets  -SB bridging 2x10 in supine  -Swiss ball LE flexion 2 x 10 in supine    manual therapy techniques: were applied to the:  for  minutes, including:    neuromuscular re-education activities to improve:  for  minutes. The following activities were included:    therapeutic activities to improve functional performance for 30 minutes, including:  -Paloff press 10# 2x10 each direction  -Airex sit<>stand x 10 reps  +supine LE bicycle RTB around feet 20x's  +supine LE crunch 2x10  +bridge into butterfly RTB 2x10    Not performed:  -Seated on BOSU mini reclined crunch 2x10  -Seated on BOSU reclined 5# lift 2x10  -Shuttle leg press 4 black 2x10 (D/C at next session)  -shuttle heel raises 4 black 2 x 10 (D/C at next session)    gait training to improve functional mobility and safety for  minutes, including:    supervised modalities after being cleared for contradictions: IFC Electrical Stimulation:  Santana received IFC Electrical Stimulation for pain control applied to the lumbar spine. Pt received stimulation at 100 % scan at a frequency of 9 for 0 minutes. Santana tolerated treatment well without any adverse effects. - Not applied today    hot pack for 0 minutes to lumbar spine.    cold pack for  minutes to .    Patient Education and Home Exercises       Education provided:   - Cont home exercise program  -TM walking  - Avoiding rotational movements    Written Home Exercises Provided: improving. Exercises were reviewed and Alberto was able to demonstrate them prior to the end of the session.  Alberto demonstrated good  understanding of the education provided. See Electronic Medical Record under Patient Instructions for exercises provided during therapy sessions    Assessment     Cont'ed to add more  functional movements. Provided education on continued light activities and core strengthening at this time d/t nature of injury. Consulted with PT during education and discussed addition of prone back strengthening and possible dead lifts, if appropriate.     Alberto Is progressing well towards his goals.   Patient prognosis is Good.     Patient will continue to benefit from skilled outpatient physical therapy to address the deficits listed in the problem list box on initial evaluation, provide pt/family education and to maximize pt's level of independence in the home and community environment.     Patient's spiritual, cultural and educational needs considered and pt agreeable to plan of care and goals.     Anticipated barriers to physical therapy: fracture healing    Goals: Short Term Goals (4 Weeks):  1. Patient will be compliant with home exercise program to supplement therapy in promoting functional mobility. MET  2. Patient will perform supine to sit with good control to demonstrate improved core strength.  3. Patient will report no pain during thoracolumbar active range of motion to promote functional mobility.     Long Term Goals (6 Weeks):   1. Patient will improve FOTO score to </= 46% limited to decrease perceived limitation with maintaining/changing body position.   2. Patient will perform sit to stand with good control to demonstrate improved core strength.  3. Patient will report no pain during lumbar ROM to promote return to PLOF. ** weeks         PLAN   Plan of care Certification: 7/26/2024 to 9/6/2024.     Outpatient Physical Therapy 2 times weekly for 6 weeks to include the following interventions: Electrical Stimulation  , Manual Therapy, Neuromuscular Re-ed, Patient Education, Therapeutic Activities, and Therapeutic Exercise.       Joi Delgado, PTA

## 2024-08-29 ENCOUNTER — CLINICAL SUPPORT (OUTPATIENT)
Dept: REHABILITATION | Facility: HOSPITAL | Age: 44
End: 2024-08-29
Payer: OTHER MISCELLANEOUS

## 2024-08-29 DIAGNOSIS — S32.009D CLOSED FRACTURE OF LUMBAR VERTEBRA WITH ROUTINE HEALING, UNSPECIFIED FRACTURE MORPHOLOGY, UNSPECIFIED LUMBAR VERTEBRAL LEVEL, SUBSEQUENT ENCOUNTER: Primary | ICD-10-CM

## 2024-08-29 DIAGNOSIS — M54.9 DORSALGIA: ICD-10-CM

## 2024-08-29 PROCEDURE — 97110 THERAPEUTIC EXERCISES: CPT | Mod: PN

## 2024-08-29 PROCEDURE — 97530 THERAPEUTIC ACTIVITIES: CPT | Mod: PN

## 2024-08-29 NOTE — PROGRESS NOTES
[facility]   Physical Therapy Treatment Note      Name: Santana Park  [facility] Number: [id number]    Therapy Diagnosis:   Encounter Diagnoses   Name Primary?    Closed fracture of lumbar vertebra with routine healing, unspecified fracture morphology, unspecified lumbar vertebral level, subsequent encounter Yes    Dorsalgia      Physician: Elayne Agudelo NP    Visit Date: 8/29/2024    Physician Orders: PT Eval and Treat   Medical Diagnosis from Referral: S32.009D (ICD-10-CM) - Closed fracture of transverse process of lumbar vertebra with routine healing, subsequent encounter M54.9 (ICD-10-CM) - Dorsalgia, unspecified  Evaluation Date: 7/26/2024  Authorization Period Expiration: 12/31/2024  Plan of Care Expiration: 9/6/2024  Progress Note Due: 8/23/2024  Visit # / Visits authorized: 7/ 12  FOTO: 1/3     Precautions: Standard      Time In: 945  Time Out: 1048  Total Appointment Time (timed & untimed codes): 58 minutes    PTA Visit #: 2/5       Subjective     Patient reports: with no new c/o pain or discomfort.   He was compliant with home exercise program.  Response to previous treatment: positive  Functional change: minimal    Pain: 0/10  Location: left back      Objective      Objective Measures updated at progress report unless specified.     Treatment     Alberto received the treatments listed below:      therapeutic exercises to develop strength, endurance, posture, and core stabilization for 28 minutes including:  -recumbent bike x 5 min level 5  -upper body ergometer 3'/3' forward/backwards  -Freemotion Shoulder ext 10# 2x10  -Freemotion row 10# 2x10 (increase wt at next session)  -Lebanese ball iso push contraction 10x 5 sec hold in supine  +Freemotion wood chop high>low 10# 2x10  -squats with 10lb dumbbell ea hand 2 x 10    Not performed today:  -PPT w/ swiss ball under feet x 10  -standing gastroc stretch 10 x 10 seconds  -1 x 10 TA with diaphragmatic breathing  -2 x 10 glute  sets  -2 x 10 quad sets  -SB bridging 2x10 in supine  -Swiss ball LE flexion 2 x 10 in supine    manual therapy techniques: were applied to the:  for  minutes, including:    neuromuscular re-education activities to improve:  for  minutes. The following activities were included:    therapeutic activities to improve functional performance for 30 minutes, including:  -Paloff press 13# 2x10 each direction  -quadruped alt leg lifts 2 x 10      Not performed:  -Seated on BOSU mini reclined crunch 2x10  -Seated on BOSU reclined 5# lift 2x10  -Shuttle leg press 4 black 2x10 (D/C at next session)  -shuttle heel raises 4 black 2 x 10 (D/C at next session)    gait training to improve functional mobility and safety for  minutes, including:    supervised modalities after being cleared for contradictions: IFC Electrical Stimulation:  Santana received IFC Electrical Stimulation for pain control applied to the lumbar spine. Pt received stimulation at 100 % scan at a frequency of 9 for 0 minutes. Santana tolerated treatment well without any adverse effects. - Not applied today    hot pack for 0 minutes to lumbar spine.    cold pack for  minutes to .    Patient Education and Home Exercises       Education provided:   - Cont home exercise program  -TM walking  - Avoiding rotational movements    Written Home Exercises Provided: improving. Exercises were reviewed and Alberto was able to demonstrate them prior to the end of the session.  Alberto demonstrated good  understanding of the education provided. See Electronic Medical Record under Patient Instructions for exercises provided during therapy sessions    Assessment     Cont'ed to add more functional movements. Patient tolerated added there ex well with no increased reports of pain or discomfort.     Alberto Is progressing well towards his goals.   Patient prognosis is Good.     Patient will continue to benefit from skilled outpatient physical therapy to address the deficits listed in  the problem list box on initial evaluation, provide pt/family education and to maximize pt's level of independence in the home and community environment.     Patient's spiritual, cultural and educational needs considered and pt agreeable to plan of care and goals.     Anticipated barriers to physical therapy: fracture healing    Goals: Short Term Goals (4 Weeks):  1. Patient will be compliant with home exercise program to supplement therapy in promoting functional mobility. MET  2. Patient will perform supine to sit with good control to demonstrate improved core strength.  3. Patient will report no pain during thoracolumbar active range of motion to promote functional mobility.     Long Term Goals (6 Weeks):   1. Patient will improve FOTO score to </= 46% limited to decrease perceived limitation with maintaining/changing body position.   2. Patient will perform sit to stand with good control to demonstrate improved core strength.  3. Patient will report no pain during lumbar ROM to promote return to PLOF. ** weeks         PLAN   Plan of care Certification: 7/26/2024 to 9/6/2024.     Outpatient Physical Therapy 2 times weekly for 6 weeks to include the following interventions: Electrical Stimulation  , Manual Therapy, Neuromuscular Re-ed, Patient Education, Therapeutic Activities, and Therapeutic Exercise.       Leeanna Perez, PT

## 2024-09-03 ENCOUNTER — TELEPHONE (OUTPATIENT)
Dept: NEUROSURGERY | Facility: CLINIC | Age: 44
End: 2024-09-03
Payer: COMMERCIAL

## 2024-09-03 ENCOUNTER — CLINICAL SUPPORT (OUTPATIENT)
Dept: REHABILITATION | Facility: HOSPITAL | Age: 44
End: 2024-09-03
Payer: OTHER MISCELLANEOUS

## 2024-09-03 DIAGNOSIS — S32.009D CLOSED FRACTURE OF LUMBAR VERTEBRA WITH ROUTINE HEALING, UNSPECIFIED FRACTURE MORPHOLOGY, UNSPECIFIED LUMBAR VERTEBRAL LEVEL, SUBSEQUENT ENCOUNTER: Primary | ICD-10-CM

## 2024-09-03 PROCEDURE — 97110 THERAPEUTIC EXERCISES: CPT | Mod: PN,CQ

## 2024-09-03 PROCEDURE — 97530 THERAPEUTIC ACTIVITIES: CPT | Mod: PN,CQ

## 2024-09-03 NOTE — PROGRESS NOTES
"[facility]   Physical Therapy Treatment Note      Name: Santana Park  Clinic Number: [id number]    Physician: Elayne Agudelo NP    Visit Date: 9/3/2024    Physician Orders: PT Eval and Treat   Medical Diagnosis from Referral: S32.009D (ICD-10-CM) - Closed fracture of transverse process of lumbar vertebra with routine healing, subsequent encounter M54.9 (ICD-10-CM) - Dorsalgia, unspecified  Evaluation Date: 7/26/2024  Authorization Period Expiration: 12/31/2024  Plan of Care Expiration: 9/6/2024  Progress Note Due: 8/23/2024  Visit # / Visits authorized: 10/ 12  FOTO: 1/3     Precautions: Standard      Time In: 946  Time Out: 1029  Total Appointment Time (timed & untimed codes): 43 minutes    PTA Visit #: 1/5       Subjective     Patient reports: increased "burning" L medial border of scapula. He reports he hasn't felt this pain since this his incident. He states he forced himself to cont with his 2 mile walk despite symptoms felt. He reports going to neurologist tomorrow.   He was compliant with home exercise program.  Response to previous treatment: positive  Functional change: minimal    Pain: 0/10 - denied pain today  Location: left back      Objective      Objective Measures updated at progress report unless specified.     Treatment     Alberto received the treatments listed below:      therapeutic exercises to develop strength, endurance, posture, and core stabilization for 18 minutes including:  -recumbent bike x 5 min level 5  -squats with 10lb dumbbell ea hand 2 x 10  -Prone IYT 2# wt's   -Prone hip ext LARRY knees apart/feet touch 10x's    Not performed today:  -PPT w/ swiss ball under feet x 10  -standing gastroc stretch 10 x 10 seconds  -1 x 10 TA with diaphragmatic breathing  -2 x 10 glute sets  -2 x 10 quad sets  -SB bridging 2x10 in supine  -Swiss ball LE flexion 2 x 10 in supine  -upper body ergometer 3'/3' forward/backwards  -Freemotion Shoulder ext 10# " 2x10  -Freemotion row 10# 2x10 (increase wt at next session)  -Peruvian ball iso push contraction 10x 5 sec hold in supine  -Freemotion wood chop high>low 10# 2x10    manual therapy techniques: were applied to the:  for  minutes, including:    neuromuscular re-education activities to improve:  for  minutes. The following activities were included:    therapeutic activities to improve functional performance for 25 minutes, including:  -quadruped alt leg lifts 2 x 10  +quadruped alt leg lifts/alt arm lifts RTB around feet 10x's  -RDL 10# 2x10  -Lunge pulse holding 1 10# wt 10x's  -Static lunge holding 1 5# wt 10x's small rotation over front leg and back to neutral position   -Freemotion 2 step lateral walk outs 10# 10x's  -Kneeling green ball roll outs 10x's    Not performed:  -Seated on BOSU mini reclined crunch 2x10  -Seated on BOSU reclined 5# lift 2x10  -Shuttle leg press 4 black 2x10 (D/C at next session)  -shuttle heel raises 4 black 2 x 10 (D/C at next session)  -Paloff press 13# 2x10 each direction    gait training to improve functional mobility and safety for  minutes, including:    supervised modalities after being cleared for contradictions: IFC Electrical Stimulation:  Santana received IFC Electrical Stimulation for pain control applied to the lumbar spine. Pt received stimulation at 100 % scan at a frequency of 9 for 0 minutes. Santana tolerated treatment well without any adverse effects. - Not applied today    hot pack for 0 minutes to lumbar spine.    cold pack for  minutes to .    Patient Education and Home Exercises       Education provided:   - Cont home exercise program  -TM walking  - Avoiding rotational movements    Written Home Exercises Provided: improving. Exercises were reviewed and Alberto was able to demonstrate them prior to the end of the session.  Alberto demonstrated good  understanding of the education provided. See Electronic Medical Record under Patient Instructions for exercises provided  during therapy sessions    Assessment     Pt reported only muscle fatigue and soreness with added activities today. Denied pain. 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     Alberto Is progressing well towards his goals.   Patient prognosis is Good.     Patient will continue to benefit from skilled outpatient physical therapy to address the deficits listed in the problem list box on initial evaluation, provide pt/family education and to maximize pt's level of independence in the home and community environment.     Patient's spiritual, cultural and educational needs considered and pt agreeable to plan of care and goals.     Anticipated barriers to physical therapy: fracture healing    Goals: Short Term Goals (4 Weeks):  1. Patient will be compliant with home exercise program to supplement therapy in promoting functional mobility. MET  2. Patient will perform supine to sit with good control to demonstrate improved core strength.  3. Patient will report no pain during thoracolumbar active range of motion to promote functional mobility.     Long Term Goals (6 Weeks):   1. Patient will improve FOTO score to </= 46% limited to decrease perceived limitation with maintaining/changing body position.   2. Patient will perform sit to stand with good control to demonstrate improved core strength.  3. Patient will report no pain during lumbar ROM to promote return to PLOF. ** weeks         PLAN   Plan of care Certification: 7/26/2024 to 9/6/2024.     Outpatient Physical Therapy 2 times weekly for 6 weeks to include the following interventions: Electrical Stimulation  , Manual Therapy, Neuromuscular Re-ed, Patient Education, Therapeutic Activities, and Therapeutic Exercise.       Joi Delgado, PTA

## 2024-09-03 NOTE — TELEPHONE ENCOUNTER
Called patient to reschedule an upcoming appointment with ALYSIA Salguero and provided next appointment date and time.  Future Appointments   Date Time Provider Department Center   9/3/2024  9:45 AM Joi Delgado, [facility]   9/4/2024  3:30 PM Elayne Agudelo, NP [facility]   9/5/2024  9:30 AM Leeanna Perez, PT [facility]        Patient voiced understanding and thanked me.

## 2024-09-04 ENCOUNTER — HOSPITAL ENCOUNTER (OUTPATIENT)
Dept: RADIOLOGY | Facility: HOSPITAL | Age: 44
Discharge: HOME | End: 2024-09-04
Attending: NURSE PRACTITIONER
Payer: OTHER MISCELLANEOUS

## 2024-09-04 ENCOUNTER — OFFICE VISIT (OUTPATIENT)
Dept: NEUROSURGERY | Facility: CLINIC | Age: 44
End: 2024-09-04
Payer: COMMERCIAL

## 2024-09-04 VITALS
DIASTOLIC BLOOD PRESSURE: 84 MMHG | BODY MASS INDEX: 35.08 KG/M2 | SYSTOLIC BLOOD PRESSURE: 125 MMHG | TEMPERATURE: 97 F | WEIGHT: 280.63 LBS

## 2024-09-04 DIAGNOSIS — S32.009D CLOSED FRACTURE OF TRANSVERSE PROCESS OF LUMBAR VERTEBRA WITH ROUTINE HEALING, SUBSEQUENT ENCOUNTER: ICD-10-CM

## 2024-09-04 DIAGNOSIS — S32.009D CLOSED FRACTURE OF TRANSVERSE PROCESS OF LUMBAR VERTEBRA WITH ROUTINE HEALING, SUBSEQUENT ENCOUNTER: Primary | ICD-10-CM

## 2024-09-04 DIAGNOSIS — M54.9 DORSALGIA: ICD-10-CM

## 2024-09-04 PROCEDURE — 72100 X-RAY EXAM L-S SPINE 2/3 VWS: CPT | Mod: 26,,, | Performed by: RADIOLOGY

## 2024-09-04 PROCEDURE — 3074F SYST BP LT 130 MM HG: CPT | Mod: CPTII,S$GLB,, | Performed by: NURSE PRACTITIONER

## 2024-09-04 PROCEDURE — 1160F RVW MEDS BY RX/DR IN RCRD: CPT | Mod: CPTII,S$GLB,, | Performed by: NURSE PRACTITIONER

## 2024-09-04 PROCEDURE — 3008F BODY MASS INDEX DOCD: CPT | Mod: CPTII,S$GLB,, | Performed by: NURSE PRACTITIONER

## 2024-09-04 PROCEDURE — 1159F MED LIST DOCD IN RCRD: CPT | Mod: CPTII,S$GLB,, | Performed by: NURSE PRACTITIONER

## 2024-09-04 PROCEDURE — 99213 OFFICE O/P EST LOW 20 MIN: CPT | Mod: S$GLB,,, | Performed by: NURSE PRACTITIONER

## 2024-09-04 PROCEDURE — 99999 PR PBB SHADOW E&M-EST. PATIENT-LVL III: CPT | Mod: PBBFAC,,, | Performed by: NURSE PRACTITIONER

## 2024-09-04 PROCEDURE — 72100 X-RAY EXAM L-S SPINE 2/3 VWS: CPT | Mod: TC

## 2024-09-04 PROCEDURE — 3079F DIAST BP 80-89 MM HG: CPT | Mod: CPTII,S$GLB,, | Performed by: NURSE PRACTITIONER

## 2024-09-04 NOTE — PROGRESS NOTES
Neurosurgery  Established Patient    SUBJECTIVE:     History of Present Illness: Santana Park is a 43 y.o. male being seen in clinic today with his wife to follow-up on his recent ED visit from 7/16/24. The patient slipped and fell down stairs at work landing on the left side of his low back. Denies hitting his head or neck. The ED obtained imaging which showed L3-L5 left transverse process fractures. He was placed in a brace, provided with pain medication, and instructed to follow-up outpatient. Today, he reports continued left-sided back pain. Reports slight improvement in the pain since his ED visit. Denies new neurological deficits. Rates the pain as a 5/10. Describes the pain as severe and sharp. The pain is no longer radiating down the legs. Aggravating factors include standing, bending, twisting, or lifting. Alleviating factors include rest. Denies weakness, numbness or tingling, b/b dysfunction, saddle anesthesia, or gait instability.  He is taking the muscle relaxer and pain medication only as needed with mild relief.     Interval Hx 9/4/24: The patient is being seen in clinic today for his 6 week follow-up on the L3-L5 left transverse process fractures. States that his pain has significantly improved with time and PT. He remains active with PT and is eager to perform harder exercises that could help him with returning to work. Denies subsequent falls or new neurological deficits.     Review of patient's allergies indicates:  No Known Allergies    Current Outpatient Medications   Medication Sig Dispense Refill    HYDROcodone-acetaminophen (NORCO) 5-325 mg per tablet Take 1 tablet by mouth every 6 (six) hours as needed for Pain.      pantoprazole (PROTONIX) 40 MG tablet Take 1 tablet (40 mg total) by mouth 2 (two) times daily. 180 tablet 0     No current facility-administered medications for this visit.       No past medical history on file.  Past Surgical History:   Procedure Laterality Date     ESOPHAGOGASTRODUODENOSCOPY N/A 1/30/2024    Procedure: EGD (ESOPHAGOGASTRODUODENOSCOPY);  Surgeon: Juana Flores MD;  Location: [facility];  Service: Endoscopy;  Laterality: N/A;     Family History    None       Social History     Socioeconomic History    Marital status:    Tobacco Use    Smoking status: Never    Smokeless tobacco: Never    Tobacco comments:     vape       Review of Systems    OBJECTIVE:     Vital Signs  Temp: 96.9 °F (36.1 °C)  Pulse: (P) 96  BP: 125/84  Pain Score:   1  Weight: 127.3 kg (280 lb 10.3 oz)  Body mass index is 35.08 kg/m².    Neurosurgery Physical Exam  General: well developed, well nourished, no distress.   Head: normocephalic, atraumatic  Neurologic: Alert and oriented. Thought content appropriate.  GCS: Motor: 6/Verbal: 5/Eyes: 4 GCS Total: 15  Mental Status: Awake, Alert, Oriented x 4  Language: No aphasia  Speech: No dysarthria  Cranial nerves: face symmetric, tongue midline, CN II-XII grossly intact.   Eyes: pupils equal, round, reactive to light with accomodation, EOMI.   Pulmonary: normal respirations, no signs of respiratory distress  Abdomen: soft, non-distended  Skin: Skin is warm, dry and intact.  Sensory: intact to light touch throughout  Motor Strength:Moves all extremities spontaneously with good tone.       Diagnostic Results:  There is no new imaging to review for this encounter.      ASSESSMENT/PLAN:     Santana Park is a 43 y.o. male seen in clinic today for his 6 week follow-up on the L3-L5 left transverse process fractures. We discussed that from a surgical standpoint he is cleared to return to work without restrictions. He is concerned about fully returning to work and would like to continue with PT and will discuss with workman's comp about a functional restoration program. An x-ray has been ordered today to re-evaluate the lumbar spine. I will notify him of the findings.     I would like the patient to follow-up in clinic as needed. I have  encouraged him to contact the clinic with any questions, concerns, or adverse clinical changes. He verbalized understanding.      LENA Salguero-MICHAEL  Neurosurgery  [facility].      Note dictated with voice recognition software, please excuse any grammatical errors.

## 2024-09-05 ENCOUNTER — CLINICAL SUPPORT (OUTPATIENT)
Dept: REHABILITATION | Facility: HOSPITAL | Age: 44
End: 2024-09-05
Payer: OTHER MISCELLANEOUS

## 2024-09-05 DIAGNOSIS — S32.009D CLOSED FRACTURE OF LUMBAR VERTEBRA WITH ROUTINE HEALING, UNSPECIFIED FRACTURE MORPHOLOGY, UNSPECIFIED LUMBAR VERTEBRAL LEVEL, SUBSEQUENT ENCOUNTER: Primary | ICD-10-CM

## 2024-09-05 DIAGNOSIS — M54.9 DORSALGIA: ICD-10-CM

## 2024-09-05 PROCEDURE — 97110 THERAPEUTIC EXERCISES: CPT | Mod: PN

## 2024-09-05 PROCEDURE — 97530 THERAPEUTIC ACTIVITIES: CPT | Mod: PN

## 2024-09-05 NOTE — PROGRESS NOTES
[facility]   Physical Therapy Treatment Note      Name: Santana Park  Clinic Number: [id number]    Physician: Elayne Agudelo NP    Visit Date: 9/5/2024    Physician Orders: PT Eval and Treat   Medical Diagnosis from Referral: S32.009D (ICD-10-CM) - Closed fracture of transverse process of lumbar vertebra with routine healing, subsequent encounter M54.9 (ICD-10-CM) - Dorsalgia, unspecified  Evaluation Date: 7/26/2024  Authorization Period Expiration: 12/31/2024  Plan of Care Expiration: 9/6/2024  Progress Note Due: 8/23/2024  Visit # / Visits authorized: 11/ 12  FOTO: 1/3     Precautions: Standard      Time In: 930  Time Out: 1010  Total Appointment Time (timed & untimed codes): 40 minutes    PTA Visit #: -/5       Subjective     Patient reports: he was a little sore from his last session but has recovered since then. He saw his neurologist and she has released him from her care.   He was compliant with home exercise program.  Response to previous treatment: positive  Functional change: minimal    Pain: 0/10 - denied pain today  Location: left back      Objective      Objective Measures updated at progress report unless specified.     Treatment     Alberto received the treatments listed below:      therapeutic exercises to develop strength, endurance, posture, and core stabilization for 18 minutes including:  -recumbent bike x 5 min level 5  -squats with 10lb dumbbell ea hand 2 x 10  -Prone IYT 2# wt's   -Prone hip ext LARRY knees apart/feet touch 10x's    Not performed today:  -PPT w/ swiss ball under feet x 10  -standing gastroc stretch 10 x 10 seconds  -1 x 10 TA with diaphragmatic breathing  -2 x 10 glute sets  -2 x 10 quad sets  -SB bridging 2x10 in supine  -Swiss ball LE flexion 2 x 10 in supine  -upper body ergometer 3'/3' forward/backwards  -Freemotion Shoulder ext 10# 2x10  -Freemotion row 10# 2x10 (increase wt at next session)  -Vietnamese ball iso push contraction 10x 5 sec hold  in supine  -Freemotion wood chop high>low 10# 2x10    manual therapy techniques: were applied to the:  for  minutes, including:    neuromuscular re-education activities to improve:  for  minutes. The following activities were included:    therapeutic activities to improve functional performance for 25 minutes, including:  -quadruped alt leg lifts 2 x 10  +quadruped alt leg lifts/alt arm lifts RTB around feet 10x's  -RDL 10# 2x10  -Lunge pulse holding 1 10# wt 10x's  -Static lunge holding 1 5# wt 10x's small rotation over front leg and back to neutral position   -Freemotion 2 step lateral walk outs 10# 10x's  -Kneeling green ball roll outs 10x's    Not performed:  -Seated on BOSU mini reclined crunch 2x10  -Seated on BOSU reclined 5# lift 2x10  -Shuttle leg press 4 black 2x10 (D/C at next session)  -shuttle heel raises 4 black 2 x 10 (D/C at next session)  -Paloff press 13# 2x10 each direction    gait training to improve functional mobility and safety for  minutes, including:    supervised modalities after being cleared for contradictions: IFC Electrical Stimulation:  Santana received IFC Electrical Stimulation for pain control applied to the lumbar spine. Pt received stimulation at 100 % scan at a frequency of 9 for 0 minutes. Santana tolerated treatment well without any adverse effects. - Not applied today    hot pack for 0 minutes to lumbar spine.    cold pack for  minutes to .    Patient Education and Home Exercises       Education provided:   - Cont home exercise program  -TM walking  - Avoiding rotational movements    Written Home Exercises Provided: improving. Exercises were reviewed and Alberto was able to demonstrate them prior to the end of the session.  Alberto demonstrated good  understanding of the education provided. See Electronic Medical Record under Patient Instructions for exercises provided during therapy sessions    Assessment     Pt reported only muscle fatigue and soreness with added activities  today. Denied pain. 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 Patient progressing well with core strengthening with no increased symptoms following exercises.    Alberto Is progressing well towards his goals.   Patient prognosis is Good.     Patient will continue to benefit from skilled outpatient physical therapy to address the deficits listed in the problem list box on initial evaluation, provide pt/family education and to maximize pt's level of independence in the home and community environment.     Patient's spiritual, cultural and educational needs considered and pt agreeable to plan of care and goals.     Anticipated barriers to physical therapy: fracture healing    Goals: Short Term Goals (4 Weeks):  1. Patient will be compliant with home exercise program to supplement therapy in promoting functional mobility. MET  2. Patient will perform supine to sit with good control to demonstrate improved core strength. MET  3. Patient will report no pain during thoracolumbar active range of motion to promote functional mobility. MET     Long Term Goals (6 Weeks):   1. Patient will improve FOTO score to </= 46% limited to decrease perceived limitation with maintaining/changing body position. MET  2. Patient will perform sit to stand with good control to demonstrate improved core strength. MET  3. Patient will report no pain during lumbar ROM to promote return to PLOF. ** weeks Progressing        PLAN   Plan of care Certification: 7/26/2024 to 9/6/2024.     Outpatient Physical Therapy 2 times weekly for 6 weeks to include the following interventions: Electrical Stimulation  , Manual Therapy, Neuromuscular Re-ed, Patient Education, Therapeutic Activities, and Therapeutic Exercise.       Leeanna Perez, PT

## 2024-09-09 ENCOUNTER — TELEPHONE (OUTPATIENT)
Dept: NEUROSURGERY | Facility: CLINIC | Age: 44
End: 2024-09-09
Payer: COMMERCIAL

## 2024-09-09 DIAGNOSIS — S32.009D CLOSED FRACTURE OF TRANSVERSE PROCESS OF LUMBAR VERTEBRA WITH ROUTINE HEALING, SUBSEQUENT ENCOUNTER: Primary | ICD-10-CM

## 2024-09-09 NOTE — TELEPHONE ENCOUNTER
Spoke  to pt and advised him that functional restoration test are done by physical therapy with an out of pocket cost. Pt requested extended therapy. Orders were placed for basic physical therapy pt was advised that he was clear from a surgical standpoint , therefore the clinic  will not be keeping the pt out of work and will not be modifying work returns or restrictions and that all further plan of care and returns/ restrictions  will solely be the responsibility of the  physical therapist